# Patient Record
Sex: FEMALE
[De-identification: names, ages, dates, MRNs, and addresses within clinical notes are randomized per-mention and may not be internally consistent; named-entity substitution may affect disease eponyms.]

---

## 2018-01-08 ENCOUNTER — HOSPITAL ENCOUNTER (INPATIENT)
Dept: HOSPITAL 5 - ED | Age: 73
LOS: 3 days | Discharge: HOME | DRG: 175 | End: 2018-01-11
Attending: INTERNAL MEDICINE | Admitting: INTERNAL MEDICINE
Payer: MEDICARE

## 2018-01-08 DIAGNOSIS — J96.01: ICD-10-CM

## 2018-01-08 DIAGNOSIS — J84.9: ICD-10-CM

## 2018-01-08 DIAGNOSIS — Z79.52: ICD-10-CM

## 2018-01-08 DIAGNOSIS — G47.33: ICD-10-CM

## 2018-01-08 DIAGNOSIS — I27.20: ICD-10-CM

## 2018-01-08 DIAGNOSIS — I82.441: ICD-10-CM

## 2018-01-08 DIAGNOSIS — M06.9: ICD-10-CM

## 2018-01-08 DIAGNOSIS — H40.9: ICD-10-CM

## 2018-01-08 DIAGNOSIS — I82.492: ICD-10-CM

## 2018-01-08 DIAGNOSIS — I26.99: Primary | ICD-10-CM

## 2018-01-08 DIAGNOSIS — E78.00: ICD-10-CM

## 2018-01-08 DIAGNOSIS — I25.10: ICD-10-CM

## 2018-01-08 DIAGNOSIS — Z79.899: ICD-10-CM

## 2018-01-08 LAB
ALBUMIN SERPL-MCNC: 3.5 G/DL (ref 3.9–5)
ALT SERPL-CCNC: 28 UNITS/L (ref 7–56)
APTT BLD: 24.5 SEC. (ref 24.2–36.6)
BASOPHILS # (AUTO): 0.1 K/MM3 (ref 0–0.1)
BASOPHILS NFR BLD AUTO: 0.4 % (ref 0–1.8)
BUN SERPL-MCNC: 15 MG/DL (ref 7–17)
BUN/CREAT SERPL: 19 %
CALCIUM SERPL-MCNC: 9.2 MG/DL (ref 8.4–10.2)
EOSINOPHIL # BLD AUTO: 0.4 K/MM3 (ref 0–0.4)
EOSINOPHIL NFR BLD AUTO: 3 % (ref 0–4.3)
HCT VFR BLD CALC: 50.5 % (ref 30.3–42.9)
HEMOLYSIS INDEX: 14
HGB BLD-MCNC: 16.1 GM/DL (ref 10.1–14.3)
INR PPP: 1.01 (ref 0.87–1.13)
LYMPHOCYTES # BLD AUTO: 1.2 K/MM3 (ref 1.2–5.4)
LYMPHOCYTES NFR BLD AUTO: 9 % (ref 13.4–35)
MAGNESIUM SERPL-MCNC: 2 MG/DL (ref 1.7–2.3)
MCH RBC QN AUTO: 31 PG (ref 28–32)
MCHC RBC AUTO-ENTMCNC: 32 % (ref 30–34)
MCV RBC AUTO: 97 FL (ref 79–97)
MONOCYTES # (AUTO): 1 K/MM3 (ref 0–0.8)
MONOCYTES % (AUTO): 7.3 % (ref 0–7.3)
PLATELET # BLD: 260 K/MM3 (ref 140–440)
RBC # BLD AUTO: 5.22 M/MM3 (ref 3.65–5.03)

## 2018-01-08 PROCEDURE — 94640 AIRWAY INHALATION TREATMENT: CPT

## 2018-01-08 PROCEDURE — 85025 COMPLETE CBC W/AUTO DIFF WBC: CPT

## 2018-01-08 PROCEDURE — 85730 THROMBOPLASTIN TIME PARTIAL: CPT

## 2018-01-08 PROCEDURE — 85610 PROTHROMBIN TIME: CPT

## 2018-01-08 PROCEDURE — 85379 FIBRIN DEGRADATION QUANT: CPT

## 2018-01-08 PROCEDURE — 83880 ASSAY OF NATRIURETIC PEPTIDE: CPT

## 2018-01-08 PROCEDURE — 84443 ASSAY THYROID STIM HORMONE: CPT

## 2018-01-08 PROCEDURE — 93970 EXTREMITY STUDY: CPT

## 2018-01-08 PROCEDURE — 83735 ASSAY OF MAGNESIUM: CPT

## 2018-01-08 PROCEDURE — 80048 BASIC METABOLIC PNL TOTAL CA: CPT

## 2018-01-08 PROCEDURE — 94660 CPAP INITIATION&MGMT: CPT

## 2018-01-08 PROCEDURE — 93005 ELECTROCARDIOGRAM TRACING: CPT

## 2018-01-08 PROCEDURE — 36415 COLL VENOUS BLD VENIPUNCTURE: CPT

## 2018-01-08 PROCEDURE — 93306 TTE W/DOPPLER COMPLETE: CPT

## 2018-01-08 PROCEDURE — 96361 HYDRATE IV INFUSION ADD-ON: CPT

## 2018-01-08 PROCEDURE — 94760 N-INVAS EAR/PLS OXIMETRY 1: CPT

## 2018-01-08 PROCEDURE — 80053 COMPREHEN METABOLIC PANEL: CPT

## 2018-01-08 PROCEDURE — 93010 ELECTROCARDIOGRAM REPORT: CPT

## 2018-01-08 PROCEDURE — 84439 ASSAY OF FREE THYROXINE: CPT

## 2018-01-08 PROCEDURE — 96360 HYDRATION IV INFUSION INIT: CPT

## 2018-01-08 PROCEDURE — 71046 X-RAY EXAM CHEST 2 VIEWS: CPT

## 2018-01-08 PROCEDURE — 84436 ASSAY OF TOTAL THYROXINE: CPT

## 2018-01-08 PROCEDURE — 71275 CT ANGIOGRAPHY CHEST: CPT

## 2018-01-08 PROCEDURE — 84484 ASSAY OF TROPONIN QUANT: CPT

## 2018-01-08 NOTE — EMERGENCY DEPARTMENT REPORT
ED Shortness of Breath HPI





- General


Chief Complaint: Dyspnea/Respdistress


Stated Complaint: BREATHING DIFFICULTY, TACH


Time Seen by Provider: 01/08/18 20:12


Source: patient, RN notes reviewed, old records reviewed


Mode of arrival: Wheelchair


Limitations: No Limitations





- History of Present Illness


Initial Comments: 





This is a 72-year-old female, and the patient is previously unknown to me.





Primary care Dr.: Dr Maurer





Cardiology: Dr. Guy Duong





Pulmonology: Dr. ORLY Land; [phone]





Past medical history includes rheumatoid arthritis, chronic lung disease, 

suspected secondary to rheumatoid arthritis, high cholesterol, supposed to be 

home oxygen dependent, patient reports that she is poorly compliant with her 

home oxygen this is a 72-year-old female, the patient is previously unknown to 

this provider, she presents to the ER with a complaint of painless shortness of 

breath.  It has been present for 2-3 months.  It does not radiate anywhere, it 

worsens with physical exertion, and it decreases with rest.  There is no chest 

pain, there is no abdominal pain, there is no hematemesis or bright red blood 

per rectum, the patient denies irritative, obstructive urinary symptoms, she 

has chronic cough which is not a new, worsening or different, she is not 

wheezing.  Her primary care audiologist Center in for further evaluation.  She 

has no pulmonary embolus or DVT risk factors by history, denies leg pain, leg 

swelling, recent hospital admissions, recent hospitalizations, or surgeries.  

She reports chronic two-pillow orthopnea which is not a new, worsening or 

different.

















MD Complaint: shortness of breath


-: Gradual, month(s)


Severity: moderate


Consistency: intermittent


Improves With: oxygen, rest


Worsens With: lying flat, exertion


Known History Of: COPD, other


Treatments Prior to Arrival: oxygen





- Related Data


Home Oxygen Therapy: Yes


Home Oxygen Amount: 2 Liters


 Home Medications











 Medication  Instructions  Recorded  Confirmed  Last Taken


 


Amlodipine Besylate/Benazepril 42 mg PO QAM 09/28/15 01/08/18 01/08/18





[amLODIPine-Benazepril 10/40 mg]    


 


AtorvaSTATin [Lipitor] 40 mg PO QDAY 09/28/15 01/08/18 01/08/18


 


Levothyroxine [Synthroid] 112 mcg PO QAM 09/28/15 01/08/18 01/08/18


 


Methotrexate Sodium/Pf 25 mg PO 1XW 09/28/15 01/08/18 01/08/18





[Methotrexate 50 mg/2 ml Vial]    


 


Prednisone [predniSONE] 5 mg PO QDAY 09/28/15 01/08/18 01/08/18


 


ALBUTEROL Inhaler [Proair] 2 puff IH QID PRN 01/08/18 01/08/18 01/08/18


 


Benazepril HCl 40 mg PO DAILY 01/08/18 01/08/18 01/08/18


 


Betamethasone Dipropionate 1 applicatio TP BID 01/08/18 01/08/18 01/08/18





[Betamethasone Dipropionate 0.05%    





Cream]    


 


Calcium Carbonate/Vitamin D3 1 each PO BID 01/08/18 01/08/18 01/08/18





[Calcium 500-Vit D3 600 Tablet]    


 


Famotidine [Pepcid] 20 mg PO DAILY 01/08/18 01/08/18 01/08/18


 


Folic Acid 20 mg PO QDAY 01/08/18 01/08/18 01/08/18


 


Gabapentin [Neurontin] 300 mg PO Q8HR 01/08/18 01/08/18 01/08/18


 


Mv-Mn/Folic Acid/Calcium/Vit K 1 each PO DAILY 01/08/18 01/08/18 01/08/18





[Women's 50 Plus Daily Formula]    


 


Pramipexole [Mirapex] 0.125 mg PO QPM 01/08/18 01/08/18 01/08/18


 


Latanoprost 0.005% [Xalatan 0.005%] 1 drops HS 01/09/18 01/09/18 01/08/18


 


cycloSPORINE [Restasis] 1 drop 01/09/18 01/09/18 15:16








 Previous Rx's











 Medication  Instructions  Recorded  Last Taken  Type


 


ALBUTEROL Inhaler [ProAir HFA 2 puff IH QID PRN #1 inhalation 09/28/15 01/08/18 

Rx





Inhaler]    











 Allergies











Allergy/AdvReac Type Severity Reaction Status Date / Time


 


No Known Allergies Allergy   Verified 01/09/18 00:55














ED Review of Systems


ROS: 


Stated complaint: BREATHING DIFFICULTY, TACH


Other details as noted in HPI





Constitutional: malaise.  denies: fever


Eyes: denies: eye discharge


Respiratory: shortness of breath


Cardiovascular: dyspnea on exertion.  denies: chest pain


Gastrointestinal: denies: vomiting


Genitourinary: as per HPI


Musculoskeletal: as per HPI


Skin: as per HPI


Neurological: as per HPI, weakness


Psychiatric: as per HPI





ED Past Medical Hx





- Past Medical History


Previous Medical History?: Yes


Hx Hypertension: Yes


Hx Arthritis: Yes


Additional medical history: interstitial lung disease.  Thyroid disease.  

Lipidemia.  PAD.  sleep apnea





- Surgical History


Past Surgical History?: No





- Social History


Smoking Status: Former Smoker


Substance Use Type: None





- Medications


Home Medications: 


 Home Medications











 Medication  Instructions  Recorded  Confirmed  Last Taken  Type


 


ALBUTEROL Inhaler [ProAir HFA 2 puff IH QID PRN #1 inhalation 09/28/15 01/08/18 

01/08/18 Rx





Inhaler]     


 


Amlodipine Besylate/Benazepril 42 mg PO QAM 09/28/15 01/08/18 01/08/18 History





[amLODIPine-Benazepril 10/40 mg]     


 


AtorvaSTATin [Lipitor] 40 mg PO QDAY 09/28/15 01/08/18 01/08/18 History


 


Levothyroxine [Synthroid] 112 mcg PO QAM 09/28/15 01/08/18 01/08/18 History


 


Methotrexate Sodium/Pf 25 mg PO 1XW 09/28/15 01/08/18 01/08/18 History





[Methotrexate 50 mg/2 ml Vial]     


 


Prednisone [predniSONE] 5 mg PO QDAY 09/28/15 01/08/18 01/08/18 History


 


ALBUTEROL Inhaler [Proair] 2 puff IH QID PRN 01/08/18 01/08/18 01/08/18 History


 


Benazepril HCl 40 mg PO DAILY 01/08/18 01/08/18 01/08/18 History


 


Betamethasone Dipropionate 1 applicatio TP BID 01/08/18 01/08/18 01/08/18 

History





[Betamethasone Dipropionate 0.05%     





Cream]     


 


Calcium Carbonate/Vitamin D3 1 each PO BID 01/08/18 01/08/18 01/08/18 History





[Calcium 500-Vit D3 600 Tablet]     


 


Famotidine [Pepcid] 20 mg PO DAILY 01/08/18 01/08/18 01/08/18 History


 


Folic Acid 20 mg PO QDAY 01/08/18 01/08/18 01/08/18 History


 


Gabapentin [Neurontin] 300 mg PO Q8HR 01/08/18 01/08/18 01/08/18 History


 


Mv-Mn/Folic Acid/Calcium/Vit K 1 each PO DAILY 01/08/18 01/08/18 01/08/18 

History





[Women's 50 Plus Daily Formula]     


 


Pramipexole [Mirapex] 0.125 mg PO QPM 01/08/18 01/08/18 01/08/18 History


 


Latanoprost 0.005% [Xalatan 0.005%] 1 drops HS 01/09/18 01/09/18 01/08/18 

History


 


cycloSPORINE [Restasis] 1 drop 01/09/18 01/09/18 15:16 History














ED Physical Exam





- General


Limitations: No Limitations


General appearance: alert, in no apparent distress





- Head


Head exam: Present: atraumatic, normocephalic





- Eye


Eye exam: Present: normal appearance, EOMI.  Absent: nystagmus





- ENT


ENT exam: Present: normal exam, normal orophraynx, mucous membranes moist





- Neck


Neck exam: Present: normal inspection, full ROM





- Respiratory


Respiratory exam: Present: decreased breath sounds.  Absent: respiratory 

distress, wheezes, rales, rhonchi, stridor





- Cardiovascular


Cardiovascular Exam: Present: normal rhythm, tachycardia, normal heart sounds.  

Absent: systolic murmur, diastolic murmur, rubs, gallop





- GI/Abdominal


GI/Abdominal exam: Present: soft, normal bowel sounds.  Absent: distended, 

tenderness, guarding, rebound, rigid, pulsatile mass





- Extremities Exam


Extremities exam: Present: normal inspection, full ROM, normal capillary 

refill.  Absent: tenderness, pedal edema, joint swelling, calf tenderness





- Back Exam


Back exam: Present: normal inspection, full ROM.  Absent: tenderness, CVA 

tenderness (R), paraspinal tenderness, vertebral tenderness





- Neurological Exam


Neurological exam: Present: alert, oriented X3, CN II-XII intact, other (

Extraocular movements intact.  Tongue midline.  No facial droop.  Facial 

sensation intact to light touch in the V1, V2, V3 distribution bilaterally.  5 

and 5 strength in 4 extremities..  Sensation is intact to light touch in 4 

extremities.).  Absent: motor sensory deficit





- Psychiatric


Psychiatric exam: Present: normal affect, normal mood





- Skin


Skin exam: Present: warm, dry, intact, normal color.  Absent: rash





ED Course


 Vital Signs











  01/08/18 01/08/18





  18:33 22:00


 


Temperature 97.5 F L 98.3 F


 


Pulse Rate 118 H 98 H


 


Respiratory 22 20





Rate  


 


Blood Pressure 167/76 


 


Blood Pressure  139/71





[Left]  


 


O2 Sat by Pulse 81 L 95





Oximetry  














- Reevaluation(s)


Reevaluation #1: 





01/08/18 22:24


Differential diagnosis, including but not limited to: Chronic interstitial lung 

disease, rheumatoid lung disease, pulmonary hypertension, pulmonary embolus, 

pneumonia











Assessment and plan: 72-year-old female with a suspected history of 

interstitial lung disease, rheumatoid lung disease, COPD who is poorly 

compliant with home oxygen.  She has no pulmonary embolus or DVT risk factors 

and is low risk by well's criteria clinically, and she clinically does not 

appear to be acutely decompensated.  Initial vital signs are reviewed and 

appreciated, however currently the patient is not hypoxic on submental oxygen 

she is not tachycardic, she does not have wheezes, rales, rhonchi, she does not 

have significant lower extremity edema or JVD, and does not appear to be in 

acute distress.  Furthermore, she has no chest pain, and reports that her 

symptoms have been present for months, and by her own history reports that her 

symptoms are not dramatically worsened or different.  I highly suspect that the 

patient is experiencing a natural expected progression of her underlying 

chronic medical lung disease.











Her EKG is morphologically abnormal, troponin negative 1, repeat troponin is 

pending, d-dimer was markedly elevated, CT scan of the chest with IV contrast 

will be obtained.  I discussed her case with covering cardiology, Dr. Amaya, 

who indicated that his office would be happy to see the patient is a follow-up 

later on this week for further outpatient evaluation.  I have also contacted 

her covering pulmonologist, Dr. Basilio at [place], who indicated that his office 

would also be happy to have the patient follow-up with her on this week.  

Tachycardia resolved, patient having no pain at this time, CT scan of the chest 

is pending.


Reevaluation #2: 





01/09/18 00:48


Received call from radiology.  CT scan demonstrates multiple pulmonary emboli.  

No evidence of saddle embolus.  Lovenox is ordered.  Hospital physician, Dr. Clark accepts the patient to the medical service.  Given duration of symptoms, 

hemodynamic stability, I don't believe patient requires emergent vascular 

surgery consult at 1248 in the morning, and I will defer to the inpatient team 

for further management.





ED Medical Decision Making





- Lab Data


Result diagrams: 


 01/08/18 20:21





 01/08/18 20:21








 Vital Signs











  01/08/18 01/08/18





  18:33 22:00


 


Temperature 97.5 F L 98.3 F


 


Pulse Rate 118 H 98 H


 


Respiratory 22 20





Rate  


 


Blood Pressure 167/76 


 


Blood Pressure  139/71





[Left]  


 


O2 Sat by Pulse 81 L 95





Oximetry  














 Lab Results











  01/08/18 01/08/18 01/08/18 Range/Units





  20:21 20:21 20:21 


 


WBC  13.5 H    (4.5-11.0)  K/mm3


 


RBC  5.22 H    (3.65-5.03)  M/mm3


 


Hgb  16.1 H    (10.1-14.3)  gm/dl


 


Hct  50.5 H    (30.3-42.9)  %


 


MCV  97    (79-97)  fl


 


MCH  31    (28-32)  pg


 


MCHC  32    (30-34)  %


 


RDW  16.9 H    (13.2-15.2)  %


 


Plt Count  260    (140-440)  K/mm3


 


Lymph % (Auto)  9.0 L    (13.4-35.0)  %


 


Mono % (Auto)  7.3    (0.0-7.3)  %


 


Eos % (Auto)  3.0    (0.0-4.3)  %


 


Baso % (Auto)  0.4    (0.0-1.8)  %


 


Lymph #  1.2    (1.2-5.4)  K/mm3


 


Mono #  1.0 H    (0.0-0.8)  K/mm3


 


Eos #  0.4    (0.0-0.4)  K/mm3


 


Baso #  0.1    (0.0-0.1)  K/mm3


 


Seg Neutrophils %  80.3 H    (40.0-70.0)  %


 


Seg Neutrophils #  10.8 H    (1.8-7.7)  K/mm3


 


PT   13.8   (12.2-14.9)  Sec.


 


INR   1.01   (0.87-1.13)  


 


APTT   24.5   (24.2-36.6)  Sec.


 


D-Dimer   > 65110 H   (0-234)  ng/mlDDU


 


Sodium    141  (137-145)  mmol/L


 


Potassium    4.8  (3.6-5.0)  mmol/L


 


Chloride    103.0  ()  mmol/L


 


Carbon Dioxide    22  (22-30)  mmol/L


 


Anion Gap    21  mmol/L


 


BUN    15  (7-17)  mg/dL


 


Creatinine    0.8  (0.7-1.2)  mg/dL


 


Estimated GFR    > 60  ml/min


 


BUN/Creatinine Ratio    19  %


 


Glucose    103 H  ()  mg/dL


 


Calcium    9.2  (8.4-10.2)  mg/dL


 


Magnesium    2.00  (1.7-2.3)  mg/dL


 


Total Bilirubin    0.60  (0.1-1.2)  mg/dL


 


AST    30  (5-40)  units/L


 


ALT    28  (7-56)  units/L


 


Alkaline Phosphatase    89  ()  units/L


 


Troponin T    < 0.010  (0.00-0.029)  ng/mL


 


NT-Pro-B Natriuret Pep    347.7  (0-900)  pg/mL


 


Total Protein    7.4  (6.3-8.2)  g/dL


 


Albumin    3.5 L  (3.9-5)  g/dL


 


Albumin/Globulin Ratio    0.9  %


 


TSH     (0.270-4.200)  mlU/mL


 


Free T4     (0.76-1.46)  ng/dL














  01/08/18 01/08/18 Range/Units





  20:21 20:21 


 


WBC    (4.5-11.0)  K/mm3


 


RBC    (3.65-5.03)  M/mm3


 


Hgb    (10.1-14.3)  gm/dl


 


Hct    (30.3-42.9)  %


 


MCV    (79-97)  fl


 


MCH    (28-32)  pg


 


MCHC    (30-34)  %


 


RDW    (13.2-15.2)  %


 


Plt Count    (140-440)  K/mm3


 


Lymph % (Auto)    (13.4-35.0)  %


 


Mono % (Auto)    (0.0-7.3)  %


 


Eos % (Auto)    (0.0-4.3)  %


 


Baso % (Auto)    (0.0-1.8)  %


 


Lymph #    (1.2-5.4)  K/mm3


 


Mono #    (0.0-0.8)  K/mm3


 


Eos #    (0.0-0.4)  K/mm3


 


Baso #    (0.0-0.1)  K/mm3


 


Seg Neutrophils %    (40.0-70.0)  %


 


Seg Neutrophils #    (1.8-7.7)  K/mm3


 


PT    (12.2-14.9)  Sec.


 


INR    (0.87-1.13)  


 


APTT    (24.2-36.6)  Sec.


 


D-Dimer    (0-234)  ng/mlDDU


 


Sodium    (137-145)  mmol/L


 


Potassium    (3.6-5.0)  mmol/L


 


Chloride    ()  mmol/L


 


Carbon Dioxide    (22-30)  mmol/L


 


Anion Gap    mmol/L


 


BUN    (7-17)  mg/dL


 


Creatinine    (0.7-1.2)  mg/dL


 


Estimated GFR    ml/min


 


BUN/Creatinine Ratio    %


 


Glucose    ()  mg/dL


 


Calcium    (8.4-10.2)  mg/dL


 


Magnesium    (1.7-2.3)  mg/dL


 


Total Bilirubin    (0.1-1.2)  mg/dL


 


AST    (5-40)  units/L


 


ALT    (7-56)  units/L


 


Alkaline Phosphatase    ()  units/L


 


Troponin T    (0.00-0.029)  ng/mL


 


NT-Pro-B Natriuret Pep    (0-900)  pg/mL


 


Total Protein    (6.3-8.2)  g/dL


 


Albumin    (3.9-5)  g/dL


 


Albumin/Globulin Ratio    %


 


TSH   3.460  (0.270-4.200)  mlU/mL


 


Free T4  1.52 H   (0.76-1.46)  ng/dL














- EKG Data


-: EKG Interpreted by Me





- EKG Data





01/08/18 22:27


Sinus tachycardia, 108 bpm, left axis deviation, QTC prolonged, T-wave 

inversion in lead 3, abnormal EKG, not morphologically consistent with ST 

elevation myocardial infarction





EKG #2 is grossly unchanged there is motion artifact, nonspecific changes with 

appeared to prior EKG from 2015.





- Radiology Data


Radiology results: image reviewed


interpreted by me: 





X-ray of the chest, interpreted by me: Chronic pulmonary lung disease, chronic 

interstitial lung disease, chronic bibasilar atelectasis versus infiltrate; of 

note clinical history does not corroborate or suggest pneumonia


Critical care attestation.: 


If time is entered above; I have spent that time in minutes in the direct care 

of this critically ill patient, excluding procedure time.








ED Disposition


Clinical Impression: 


 Pulmonary emboli





Disposition: DC-09 OP ADMIT IP TO THIS HOSP


Is pt being admited?: Yes


Condition: Good

## 2018-01-09 PROCEDURE — 5A09357 ASSISTANCE WITH RESPIRATORY VENTILATION, LESS THAN 24 CONSECUTIVE HOURS, CONTINUOUS POSITIVE AIRWAY PRESSURE: ICD-10-PCS | Performed by: INTERNAL MEDICINE

## 2018-01-09 RX ADMIN — IPRATROPIUM BROMIDE AND ALBUTEROL SULFATE SCH: .5; 3 SOLUTION RESPIRATORY (INHALATION) at 10:01

## 2018-01-09 RX ADMIN — BETAMETHASONE DIPROPIONATE SCH APPLIC: 0.5 OINTMENT TOPICAL at 12:08

## 2018-01-09 RX ADMIN — Medication SCH EACH: at 12:08

## 2018-01-09 RX ADMIN — Medication SCH EACH: at 21:38

## 2018-01-09 RX ADMIN — FAMOTIDINE SCH MG: 20 TABLET ORAL at 12:08

## 2018-01-09 RX ADMIN — GABAPENTIN SCH MG: 300 CAPSULE ORAL at 15:20

## 2018-01-09 RX ADMIN — IPRATROPIUM BROMIDE AND ALBUTEROL SULFATE SCH: .5; 3 SOLUTION RESPIRATORY (INHALATION) at 16:35

## 2018-01-09 RX ADMIN — ENOXAPARIN SODIUM SCH MG: 150 INJECTION SUBCUTANEOUS at 15:19

## 2018-01-09 RX ADMIN — GABAPENTIN SCH MG: 300 CAPSULE ORAL at 21:38

## 2018-01-09 RX ADMIN — LEVOTHYROXINE SODIUM SCH MCG: 0.11 TABLET ORAL at 07:44

## 2018-01-09 RX ADMIN — GABAPENTIN SCH MG: 300 CAPSULE ORAL at 07:44

## 2018-01-09 RX ADMIN — IPRATROPIUM BROMIDE AND ALBUTEROL SULFATE SCH AMPUL: .5; 3 SOLUTION RESPIRATORY (INHALATION) at 20:18

## 2018-01-09 RX ADMIN — IPRATROPIUM BROMIDE AND ALBUTEROL SULFATE SCH: .5; 3 SOLUTION RESPIRATORY (INHALATION) at 14:16

## 2018-01-09 RX ADMIN — PRAMIPEXOLE DIHYDROCHLORIDE SCH MG: 0.12 TABLET ORAL at 17:50

## 2018-01-09 RX ADMIN — BETAMETHASONE DIPROPIONATE SCH APPLIC: 0.5 OINTMENT TOPICAL at 21:39

## 2018-01-09 RX ADMIN — AMLODIPINE BESYLATE SCH: 10 TABLET ORAL at 11:00

## 2018-01-09 RX ADMIN — PREDNISONE SCH MG: 5 TABLET ORAL at 12:08

## 2018-01-09 RX ADMIN — LISINOPRIL SCH MG: 40 TABLET ORAL at 15:20

## 2018-01-09 NOTE — CONSULTATION
History of Present Illness


Consult date: 01/09/18


Reason for consult: other (ILD)


History of present illness: 





Called to evaluate case of a 72-year-old female, with prior history of 

rheumatoid disease follow-up at [facility].  She presents with progressive shortness 

of breath, dyspnea on exertion of many weeks onset.  She denies fevers chills 

or hemoptysis.  She had been feeling progressively weak Sometimes with pressure 

on her chest.  The recent surgery or long distance travel.





The patient reports that she's been followed for ILD secondary to rheumatoid 

arthritis at [facility] with Dr. HOLLOWAY".  She tried to contact them during this period 

of time.  Was unable to do so.  She had also been told to use oxygen for the 

past year but she had been avoiding to do so.  Uses oxygen with CPAP for sleep 

apnea and nighttime.  Has been occurring methotrexate treatment for years.  

Denies any history of pulmonary hypertension or prior pulmonary embolism/DVT.  

No history of asian tea preparation, medications for weight loss treatment in 

the past.  No family history of pulmonary hypertension





Diagnostic evoluation performed on admission here showed evidence of DVT on 

bilateral lower extremity venous Doppler.  


CTA showing bilateral pulmonary embolism, reportedly extensive.  She can 

additional diagnostic findings with evidence of interstitial lung disease.My 

review of the scan shows significant septal thickening, subpleural fibrosis 

with honeycombing and traction bronchiectasis.  UIP-like pattern in the context 

of rheumatoid  disease.  


Echocardiogram showed evidence of left ventricle diastolic dysfunction and 

pulmonary hypertension with pressures at 72 mmHg.





Past History


Past Medical History: CAD, other (RA, fibromyalgia, glaucoma, sleep apnea)


Past Surgical History: No surgical history


Social history: full code.  denies: smoking, alcohol abuse, prescription drug 

abuse, IV drug use


Family history: no significant family history





Medications and Allergies


 Allergies











Allergy/AdvReac Type Severity Reaction Status Date / Time


 


No Known Allergies Allergy   Verified 01/09/18 00:55











 Home Medications











 Medication  Instructions  Recorded  Confirmed  Last Taken  Type


 


ALBUTEROL Inhaler [ProAir HFA 2 puff IH QID PRN #1 inhalation 09/28/15 01/08/18 

01/08/18 Rx





Inhaler]     


 


Amlodipine Besylate/Benazepril 42 mg PO QAM 09/28/15 01/08/18 01/08/18 History





[amLODIPine-Benazepril 10/40 mg]     


 


AtorvaSTATin [Lipitor] 40 mg PO QDAY 09/28/15 01/08/18 01/08/18 History


 


Levothyroxine [Synthroid] 112 mcg PO QAM 09/28/15 01/08/18 01/08/18 History


 


Methotrexate Sodium/Pf 25 mg PO 1XW 09/28/15 01/08/18 01/08/18 History





[Methotrexate 50 mg/2 ml Vial]     


 


Prednisone [predniSONE] 5 mg PO QDAY 09/28/15 01/08/18 01/08/18 History


 


ALBUTEROL Inhaler [Proair] 2 puff IH QID PRN 01/08/18 01/08/18 01/08/18 History


 


Benazepril HCl 40 mg PO DAILY 01/08/18 01/08/18 01/08/18 History


 


Betamethasone Dipropionate 1 applicatio TP BID 01/08/18 01/08/18 01/08/18 

History





[Betamethasone Dipropionate 0.05%     





Cream]     


 


Calcium Carbonate/Vitamin D3 1 each PO BID 01/08/18 01/08/18 01/08/18 History





[Calcium 500-Vit D3 600 Tablet]     


 


Famotidine [Pepcid] 20 mg PO DAILY 01/08/18 01/08/18 01/08/18 History


 


Folic Acid 20 mg PO QDAY 01/08/18 01/08/18 01/08/18 History


 


Gabapentin [Neurontin] 300 mg PO Q8HR 01/08/18 01/08/18 01/08/18 History


 


Mv-Mn/Folic Acid/Calcium/Vit K 1 each PO DAILY 01/08/18 01/08/18 01/08/18 

History





[Women's 50 Plus Daily Formula]     


 


Pramipexole [Mirapex] 0.125 mg PO QPM 01/08/18 01/08/18 01/08/18 History


 


Latanoprost 0.005% [Xalatan 0.005%] 1 drops HS 01/09/18 01/09/18 01/08/18 

History


 


cycloSPORINE [Restasis] 1 drop 01/09/18 01/09/18 15:16 History











Active Meds: 


Active Medications





Albuterol (Proventil)  2.5 mg IH Q4HRT PRN


   PRN Reason: Shortness Of Breath


   Last Admin: 01/09/18 09:57 Dose:  2.5 mg


Albuterol/Ipratropium (Duoneb *Not For Prn Use*)  1 ampul IH Q4HRT [facility]


   Last Admin: 01/09/18 16:35 Dose:  Not Given


Amlodipine Besylate (Norvasc)  10 mg PO DAILY [facility]


   Last Admin: 01/09/18 11:00 Dose:  Not Given


Atorvastatin Calcium (Lipitor)  40 mg PO QDAY [facility]


   Last Admin: 01/09/18 12:08 Dose:  40 mg


Betamethasone Dipropionate (Diprosone)  1 applic TP BID [facility]


   Last Admin: 01/09/18 12:08 Dose:  1 applic


Calcium/Vitamin D (Oysco D 500 Mg-200 Unit)  1 each PO BID [facility]


   Last Admin: 01/09/18 12:08 Dose:  1 each


Enoxaparin Sodium (Lovenox)  120 mg 1 mg/kg (120 mg) SUB-Q Q12H [facility]


   Last Admin: 01/09/18 15:19 Dose:  120 mg


Famotidine (Pepcid)  20 mg PO DAILY [facility]


   Last Admin: 01/09/18 12:08 Dose:  20 mg


Gabapentin (Neurontin)  300 mg PO Q8HR [facility]


   Last Admin: 01/09/18 15:20 Dose:  300 mg


Levothyroxine Sodium (Synthroid)  112 mcg PO QAM@0600 [facility]


   Last Admin: 01/09/18 07:44 Dose:  112 mcg


Lisinopril (Zestril)  40 mg PO QDAY [facility]


   Last Admin: 01/09/18 15:20 Dose:  40 mg


Miscellaneous Medication (Methotrexate Sodium/Pf [Methotrexate 50 Mg/2 Ml Vial]

)  25 mg PO 1XW [facility]


Pramipexole Dihydrochloride (Mirapex)  0.125 mg PO QPM [facility]


Prednisone (Deltasone)  5 mg PO QDAY [facility]


   Last Admin: 01/09/18 12:08 Dose:  5 mg











Review of Systems


Constitutional: fatigue, weakness, no fever, no chills, no sweats, no night 

sweats


Cardiovascular: palpitations, edema, lightheadedness, shortness of breath, 

dyspnea on exertion, no chest pain, no orthopnea, no syncope


Respiratory: no cough, no cough with sputum, no hemoptysis, no wheezing


Gastrointestinal: no abdominal pain, no nausea, no vomiting


Integumentary: no rash


Neurological: no head injury, no transient paralysis, no paralysis, no weakness

, no parathesias, no numbness, no vertigo, no headaches


Hematologic/Lymphatic: no easy bruising, no easy bleeding, no lymphadenopathy, 

no lymphedema, no thrombophilia





Physical Examination


Vital signs: 


 Vital Signs











Temp Pulse Resp BP Pulse Ox


 


 97.5 F L  118 H  22   167/76   81 L


 


 01/08/18 18:33  01/08/18 18:33  01/08/18 18:33  01/08/18 18:33  01/08/18 18:33











General appearance: no acute distress, alert, other (morbidly obese)


Eyes: non-icteric


ENT: oropharynx moist, other (Mallampati 3.  Perioral cyanosis with patient OFF 

oxygen)


Effort: normal


Ascultation: Bilateral: rales (bilateral inspiratory crackles lower half of 

both pulmonary fields.  No wheezing)


Percussion: Bilateral: not dull


Cardiovascular: regular rate and rhythm, other (no murmur)


Gastrointestinal: normoactive bowel sounds, non-tender, non-distended


Integumentary: normal


Extremities: cyanosis, other (trace pretibial edema.  Clubbing present)


normal mental status, non-focal exam, CN II-XII normal


mood appropriate, affect normal





Results





- Laboratory Findings


CBC and BMP: 


 01/08/18 20:21





 01/08/18 20:21


PT/INR, D-dimer











PT  13.8 Sec. (12.2-14.9)   01/08/18  20:21    


 


INR  1.01  (0.87-1.13)   01/08/18  20:21    


 


D-Dimer  > 22568 ng/mlDDU (0-234)  H  01/08/18  20:21    








Abnormal lab findings: 


 Abnormal Labs











  01/08/18 01/08/18 01/08/18





  20:21 20:21 20:21


 


WBC  13.5 H  


 


RBC  5.22 H  


 


Hgb  16.1 H  


 


Hct  50.5 H  


 


RDW  16.9 H  


 


Lymph % (Auto)  9.0 L  


 


Mono #  1.0 H  


 


Seg Neutrophils %  80.3 H  


 


Seg Neutrophils #  10.8 H  


 


D-Dimer   > 17629 H 


 


Glucose    103 H


 


Albumin    3.5 L


 


Free T4   














  01/08/18





  20:21


 


WBC 


 


RBC 


 


Hgb 


 


Hct 


 


RDW 


 


Lymph % (Auto) 


 


Mono # 


 


Seg Neutrophils % 


 


Seg Neutrophils # 


 


D-Dimer 


 


Glucose 


 


Albumin 


 


Free T4  1.52 H














- Diagnostic Findings


Chest x-ray: report reviewed, image reviewed


CT scan - chest: report reviewed, image reviewed





Assessment and Plan





Bilateral pulmonary embolism


DVT


Interstitial lung disease.  Suggestive of rheumatoid lung in the context of 

prior RA.  Also prior history of methotrexate treatment


Pulmonary hypertension.  Most likely chronic since his unlikely patient will 

survive acute cor pulmonale secondary to PE, with pressures > 40 mmHg.  Also no 

large right ventricular strain on echocardiogram.  Most likely either PAH or 

CTEPH /chronic Thrombo-embolic pulmonary hypertension.  Also chronically 

hypoxemic so it could be also reactive to this.








Recommendations





Albuterol 2.5 milligram nebulizations every 4-6 hours with or without 

ipratropium if chest congestion noted


Discussed with patient oxygen use in detail.  Oxygen support via nasal cannula 

or mask to maintain oximetry over 92%


Continue anticoagulation for pulmonary embolism


CPAP with oxygen nighttime


Regarding her ILD, the patient will need further reevaluation.  Specifically, 

whether to continue with methotrexate (which can cause ILD) versus biological 

agent and performing additional biopsy evaluation is necessary for her ILD.  

Being realistic, bronchoscopy is not good for this ( drug induce vs CTD ILD ) 

and the patient might be at the point where open lung biopsy may be difficult 

to perform, because of medical risk


She will also need evaluation for pulmonary hypertension/PAH.  I will recommend 

to continue with aggressive 24/7 oxygen support and consider at some point R/L 

cardiac catheterization.  It may be difficult to separate chronic PAH from CTPEH

, but if right heart catheterization is supportive of PAH Dx, the patient might 

be a candidate for Riociguat therapy


HIV testing  for PHT


VQ scan for CTEPH





Findings were discussed with the patient and her  in detail.  All 

questions answered.











Thanks

## 2018-01-09 NOTE — PROGRESS NOTE
<GUNNER ARCE - Last Filed: 01/09/18 15:57>





Assessment and Plan


Assessment and plan: 





72-year-old female with medical history significant for interstitial lung 

disease, rheumatoid arthritis, fibromyalgia, glaucoma, sleep apnea presented to 

the ED complaining of shortness of breath, hyperventilating for the last 2-3 

months








Large bilateral PE


- She is on therapeutic Lovenox


- Vascular Surgery following, doesn't recommend catheter directed thrombolysis


-Echo ordered





Interstitial lung disease


Chronic Pulmonary consulted





Rheumatoid arthritis


Chronic, continue home medications





Sleep apnea


Uses CPAP at home, pulmonary consulted





CAD


- continue home meds





DVT


Venous US showed ACUTE DVT NOTED IN RT. PTV AND LT. SOLEAL VEIN CLOT NOTED TO 

BE EXTENDING INTO LT PERONEAL VEINS


Pt on Lovenox





History


Interval history: 





Pt was seen and examined. Complains only of SOB with exertion. Denies CH, NV.





Hospitalist Physical





- Constitutional


Vitals: 


 











Temp Pulse Resp BP Pulse Ox


 


 98.2 F   84   20   136/60   87 


 


 01/09/18 13:48  01/09/18 13:48  01/09/18 13:48  01/09/18 13:48  01/09/18 13:48











General appearance: Present: no acute distress, well-nourished





- EENT


Eyes: Present: PERRL, EOM intact


ENT: hearing intact, clear oral mucosa, dentition normal





- Neck


Neck: Present: supple, normal ROM





- Respiratory


Respiratory effort: normal


Respiratory: bilateral: CTA





- Cardiovascular


Rhythm: regular


Heart Sounds: Present: S1 & S2





- Extremities


Extremities: no ischemia, No edema


Peripheral Pulses: within normal limits





- Abdominal


General gastrointestinal: soft, non-tender





- Integumentary


Integumentary: Present: clear, warm, dry





- Psychiatric


Psychiatric: appropriate mood/affect, cooperative





- Neurologic


Neurologic: CNII-XII intact, moves all extremities





- Allied Health


Allied health notes reviewed: nursing





Results





- Labs


CBC & Chem 7: 


 01/08/18 20:21





 01/08/18 20:21


Labs: 


 Laboratory Last Values











WBC  13.5 K/mm3 (4.5-11.0)  H  01/08/18  20:21    


 


RBC  5.22 M/mm3 (3.65-5.03)  H  01/08/18  20:21    


 


Hgb  16.1 gm/dl (10.1-14.3)  H  01/08/18  20:21    


 


Hct  50.5 % (30.3-42.9)  H  01/08/18  20:21    


 


MCV  97 fl (79-97)   01/08/18  20:21    


 


MCH  31 pg (28-32)   01/08/18  20:21    


 


MCHC  32 % (30-34)   01/08/18  20:21    


 


RDW  16.9 % (13.2-15.2)  H  01/08/18  20:21    


 


Plt Count  260 K/mm3 (140-440)   01/08/18  20:21    


 


Lymph % (Auto)  9.0 % (13.4-35.0)  L  01/08/18  20:21    


 


Mono % (Auto)  7.3 % (0.0-7.3)   01/08/18  20:21    


 


Eos % (Auto)  3.0 % (0.0-4.3)   01/08/18  20:21    


 


Baso % (Auto)  0.4 % (0.0-1.8)   01/08/18  20:21    


 


Lymph #  1.2 K/mm3 (1.2-5.4)   01/08/18  20:21    


 


Mono #  1.0 K/mm3 (0.0-0.8)  H  01/08/18  20:21    


 


Eos #  0.4 K/mm3 (0.0-0.4)   01/08/18  20:21    


 


Baso #  0.1 K/mm3 (0.0-0.1)   01/08/18  20:21    


 


Seg Neutrophils %  80.3 % (40.0-70.0)  H  01/08/18  20:21    


 


Seg Neutrophils #  10.8 K/mm3 (1.8-7.7)  H  01/08/18  20:21    


 


PT  13.8 Sec. (12.2-14.9)   01/08/18  20:21    


 


INR  1.01  (0.87-1.13)   01/08/18  20:21    


 


APTT  24.5 Sec. (24.2-36.6)   01/08/18  20:21    


 


D-Dimer  > 78318 ng/mlDDU (0-234)  H  01/08/18  20:21    


 


Sodium  141 mmol/L (137-145)   01/08/18  20:21    


 


Potassium  4.8 mmol/L (3.6-5.0)   01/08/18  20:21    


 


Chloride  103.0 mmol/L ()   01/08/18  20:21    


 


Carbon Dioxide  22 mmol/L (22-30)   01/08/18  20:21    


 


Anion Gap  21 mmol/L  01/08/18  20:21    


 


BUN  15 mg/dL (7-17)   01/08/18  20:21    


 


Creatinine  0.8 mg/dL (0.7-1.2)   01/08/18  20:21    


 


Estimated GFR  > 60 ml/min  01/08/18  20:21    


 


BUN/Creatinine Ratio  19 %  01/08/18  20:21    


 


Glucose  103 mg/dL ()  H  01/08/18  20:21    


 


Calcium  9.2 mg/dL (8.4-10.2)   01/08/18  20:21    


 


Magnesium  2.00 mg/dL (1.7-2.3)   01/08/18  20:21    


 


Total Bilirubin  0.60 mg/dL (0.1-1.2)   01/08/18  20:21    


 


AST  30 units/L (5-40)   01/08/18  20:21    


 


ALT  28 units/L (7-56)   01/08/18  20:21    


 


Alkaline Phosphatase  89 units/L ()   01/08/18  20:21    


 


Troponin T  < 0.010 ng/mL (0.00-0.029)   01/08/18  22:55    


 


NT-Pro-B Natriuret Pep  347.7 pg/mL (0-900)   01/08/18  20:21    


 


Total Protein  7.4 g/dL (6.3-8.2)   01/08/18  20:21    


 


Albumin  3.5 g/dL (3.9-5)  L  01/08/18  20:21    


 


Albumin/Globulin Ratio  0.9 %  01/08/18  20:21    


 


TSH  3.460 mlU/mL (0.270-4.200)   01/08/18  20:21    


 


Free T4  1.52 ng/dL (0.76-1.46)  H  01/08/18  20:21    














<KAY CABALLERO M - Last Filed: 01/10/18 17:21>





Hospitalist Physical





- Constitutional


Vitals: 


 











Temp Pulse Resp BP Pulse Ox


 


 97.3 F L  80   20   134/58   93 


 


 01/10/18 13:44  01/10/18 16:22  01/10/18 16:22  01/10/18 13:44  01/10/18 14:30














Results





- Labs


CBC & Chem 7: 


 01/08/18 20:21





 01/10/18 11:00


Labs: 


 Laboratory Last Values











WBC  13.5 K/mm3 (4.5-11.0)  H  01/08/18  20:21    


 


RBC  5.22 M/mm3 (3.65-5.03)  H  01/08/18  20:21    


 


Hgb  16.1 gm/dl (10.1-14.3)  H  01/08/18  20:21    


 


Hct  50.5 % (30.3-42.9)  H  01/08/18  20:21    


 


MCV  97 fl (79-97)   01/08/18  20:21    


 


MCH  31 pg (28-32)   01/08/18  20:21    


 


MCHC  32 % (30-34)   01/08/18  20:21    


 


RDW  16.9 % (13.2-15.2)  H  01/08/18  20:21    


 


Plt Count  260 K/mm3 (140-440)   01/08/18  20:21    


 


Lymph % (Auto)  9.0 % (13.4-35.0)  L  01/08/18  20:21    


 


Mono % (Auto)  7.3 % (0.0-7.3)   01/08/18  20:21    


 


Eos % (Auto)  3.0 % (0.0-4.3)   01/08/18  20:21    


 


Baso % (Auto)  0.4 % (0.0-1.8)   01/08/18  20:21    


 


Lymph #  1.2 K/mm3 (1.2-5.4)   01/08/18  20:21    


 


Mono #  1.0 K/mm3 (0.0-0.8)  H  01/08/18  20:21    


 


Eos #  0.4 K/mm3 (0.0-0.4)   01/08/18  20:21    


 


Baso #  0.1 K/mm3 (0.0-0.1)   01/08/18  20:21    


 


Seg Neutrophils %  80.3 % (40.0-70.0)  H  01/08/18  20:21    


 


Seg Neutrophils #  10.8 K/mm3 (1.8-7.7)  H  01/08/18  20:21    


 


PT  13.8 Sec. (12.2-14.9)   01/08/18  20:21    


 


INR  1.01  (0.87-1.13)   01/08/18  20:21    


 


APTT  24.5 Sec. (24.2-36.6)   01/08/18  20:21    


 


D-Dimer  > 11483 ng/mlDDU (0-234)  H  01/08/18  20:21    


 


Sodium  138 mmol/L (137-145)   01/10/18  11:00    


 


Potassium  4.4 mmol/L (3.6-5.0)   01/10/18  11:00    


 


Chloride  102.0 mmol/L ()   01/10/18  11:00    


 


Carbon Dioxide  19 mmol/L (22-30)  L  01/10/18  11:00    


 


Anion Gap  21 mmol/L  01/10/18  11:00    


 


BUN  8 mg/dL (7-17)   01/10/18  11:00    


 


Creatinine  0.8 mg/dL (0.7-1.2)   01/10/18  11:00    


 


Estimated GFR  > 60 ml/min  01/10/18  11:00    


 


BUN/Creatinine Ratio  10 %  01/10/18  11:00    


 


Glucose  193 mg/dL ()  H  01/10/18  11:00    


 


Calcium  8.7 mg/dL (8.4-10.2)   01/10/18  11:00    


 


Magnesium  2.00 mg/dL (1.7-2.3)   01/08/18  20:21    


 


Total Bilirubin  0.60 mg/dL (0.1-1.2)   01/08/18  20:21    


 


AST  30 units/L (5-40)   01/08/18  20:21    


 


ALT  28 units/L (7-56)   01/08/18  20:21    


 


Alkaline Phosphatase  89 units/L ()   01/08/18  20:21    


 


Troponin T  < 0.010 ng/mL (0.00-0.029)   01/08/18  22:55    


 


NT-Pro-B Natriuret Pep  347.7 pg/mL (0-900)   01/08/18  20:21    


 


Total Protein  7.4 g/dL (6.3-8.2)   01/08/18  20:21    


 


Albumin  3.5 g/dL (3.9-5)  L  01/08/18  20:21    


 


Albumin/Globulin Ratio  0.9 %  01/08/18  20:21    


 


TSH  3.460 mlU/mL (0.270-4.200)   01/08/18  20:21    


 


Free T4  1.52 ng/dL (0.76-1.46)  H  01/08/18  20:21    


 


Thyroxine (T4)  10.0 ug/dL (4.0-12.0)   01/10/18  11:00

## 2018-01-09 NOTE — HISTORY AND PHYSICAL REPORT
History of Present Illness


Date of examination: 01/09/18


Date of admission: 


01/09/18 00:54





Chief complaint: 





Shortness of breath


History of present illness: 


72-year-old  female with past medical history significant for 

interstitial lung disease, rheumatoid arthritis, fibromyalgia, glaucoma, sleep 

apnean presented to the emergency department complaining of shortness of breath

, hyperventilating, and dry mouth for the last 2-3 months.  Patient has been 

using oxygen and non compliant.  Patient denies chest pain, fever, chills, 

palpitation, dizziness at current presentation.  Patient said she has history 

of  CAD and has been followed by Dr John Dave in the office. Has been followed 

by pulmonary and rheumatologist at [place].  In the ED and CTA was done shows 

large bilateral PE.  











Past History


Past Medical History: CAD, other (RA, fibromyalgia, glaucoma, sleep apnea)


Past Surgical History: No surgical history


Social history: full code.  denies: smoking, alcohol abuse, prescription drug 

abuse, IV drug use


Family history: no significant family history





Medications and Allergies


 Allergies











Allergy/AdvReac Type Severity Reaction Status Date / Time


 


No Known Allergies Allergy   Verified 01/09/18 00:55











 Home Medications











 Medication  Instructions  Recorded  Confirmed  Last Taken  Type


 


ALBUTEROL Inhaler [ProAir HFA 2 puff IH QID PRN #1 inhalation 09/28/15 01/08/18 

01/08/18 Rx





Inhaler]     


 


Amlodipine Besylate/Benazepril 42 mg PO QAM 09/28/15 01/08/18 01/08/18 History





[amLODIPine-Benazepril 10/40 mg]     


 


AtorvaSTATin [Lipitor] 40 mg PO QDAY 09/28/15 01/08/18 01/08/18 History


 


Levothyroxine [Synthroid] 112 mcg PO QAM 09/28/15 01/08/18 01/08/18 History


 


Methotrexate Sodium/Pf 25 mg PO 1XW 09/28/15 01/08/18 01/08/18 History





[Methotrexate 50 mg/2 ml Vial]     


 


Prednisone [predniSONE] 5 mg PO QDAY 09/28/15 01/08/18 01/08/18 History


 


ALBUTEROL Inhaler [Proair] 2 puff IH QID PRN 01/08/18 01/08/18 01/08/18 History


 


Benazepril HCl 40 mg PO DAILY 01/08/18 01/08/18 01/08/18 History


 


Betamethasone Dipropionate 1 applicatio TP BID 01/08/18 01/08/18 01/08/18 

History





[Betamethasone Dipropionate 0.05%     





Cream]     


 


Calcium Carbonate/Vitamin D3 1 each PO BID 01/08/18 01/08/18 01/08/18 History





[Calcium 500-Vit D3 600 Tablet]     


 


Famotidine [Pepcid] 20 mg PO DAILY 01/08/18 01/08/18 01/08/18 History


 


Folic Acid 20 mg PO QDAY 01/08/18 01/08/18 01/08/18 History


 


Gabapentin [Neurontin] 300 mg PO Q8HR 01/08/18 01/08/18 01/08/18 History


 


Mv-Mn/Folic Acid/Calcium/Vit K 1 each PO DAILY 01/08/18 01/08/18 01/08/18 

History





[Women's 50 Plus Daily Formula]     


 


Pramipexole [Mirapex] 0.125 mg PO QPM 01/08/18 01/08/18 01/08/18 History











Active Meds: 


Active Medications





Albuterol (Proair)  2 puff IH QID PRN


   PRN Reason: Shortness Of Breath


Albuterol/Ipratropium (Duoneb *Not For Prn Use*)  1 ampul IH Q4HRT [facility]


Atorvastatin Calcium (Lipitor)  40 mg PO QDAY [facility]


Enoxaparin Sodium (Lovenox)  120 mg 1 mg/kg (120 mg) SUB-Q Q12H [facility]


Famotidine (Pepcid)  20 mg PO DAILY [facility]


Gabapentin (Neurontin)  300 mg PO Q8HR [facility]


Levothyroxine Sodium (Synthroid)  112 mcg PO QAM [facility]


Miscellaneous Medication (Amlodipine Besylate/Benazepril [Amlodipine-Benazepril 

10/40 Mg])  42 mg PO QAM [facility]


Miscellaneous Medication (Benazepril Hcl [Benazepril Hcl])  40 mg PO DAILY [facility]


Miscellaneous Medication (Betamethasone Dipropionate [Betamethasone 

Dipropionate 0.05% Cream])  1 applicatio TP BID [facility]


Miscellaneous Medication (Calcium Carbonate/Vitamin D3 [Calcium 500-Vit D3 600 

Tablet])  1 each PO BID [facility]


Miscellaneous Medication (Methotrexate Sodium/Pf [Methotrexate 50 Mg/2 Ml Vial]

)  25 mg PO 1XW [facility]


Miscellaneous Medication (Prednisone [Prednisone])  5 mg PO QDAY [facility]


Pramipexole Dihydrochloride (Mirapex)  0.125 mg PO QPM [facility]











Exam





- Physical Exam


Narrative exam: 





 Not in cardiopulmonary distress. 


 The patient is morbidly obese.


 Vital signs as documented.


 Head exam is unremarkable.


 No scleral icterus .


 Neck is without jugular venous distension, thyromegaly, or carotid bruits. 


 Lungs are clear to auscultation.


Cardiac exam reveals regular rate and  Rhythm. First and second heart sounds 

normal. No murmurs, rubs or gallops. 


Abdominal exam reveals normal bowel sounds, no masses, no organomegaly and no 

aortic enlargement. 


Extremities are nonedematous and both femoral and pedal pulses are normal.


CNS: Alert and oriented 3.  No focal weakness.





- Constitutional


Vitals: 


 











Temp Pulse Resp BP Pulse Ox


 


 97.4 F L  96 H  20   128/58   95 


 


 01/09/18 03:49  01/09/18 03:49  01/09/18 03:49  01/09/18 03:49  01/08/18 22:00














Results





- Labs


CBC & Chem 7: 


 01/08/18 20:21





 01/08/18 20:21


Labs: 


 Laboratory Last Values











WBC  13.5 K/mm3 (4.5-11.0)  H  01/08/18  20:21    


 


RBC  5.22 M/mm3 (3.65-5.03)  H  01/08/18  20:21    


 


Hgb  16.1 gm/dl (10.1-14.3)  H  01/08/18  20:21    


 


Hct  50.5 % (30.3-42.9)  H  01/08/18  20:21    


 


MCV  97 fl (79-97)   01/08/18  20:21    


 


MCH  31 pg (28-32)   01/08/18  20:21    


 


MCHC  32 % (30-34)   01/08/18  20:21    


 


RDW  16.9 % (13.2-15.2)  H  01/08/18  20:21    


 


Plt Count  260 K/mm3 (140-440)   01/08/18  20:21    


 


Lymph % (Auto)  9.0 % (13.4-35.0)  L  01/08/18  20:21    


 


Mono % (Auto)  7.3 % (0.0-7.3)   01/08/18  20:21    


 


Eos % (Auto)  3.0 % (0.0-4.3)   01/08/18  20:21    


 


Baso % (Auto)  0.4 % (0.0-1.8)   01/08/18  20:21    


 


Lymph #  1.2 K/mm3 (1.2-5.4)   01/08/18  20:21    


 


Mono #  1.0 K/mm3 (0.0-0.8)  H  01/08/18  20:21    


 


Eos #  0.4 K/mm3 (0.0-0.4)   01/08/18  20:21    


 


Baso #  0.1 K/mm3 (0.0-0.1)   01/08/18  20:21    


 


Seg Neutrophils %  80.3 % (40.0-70.0)  H  01/08/18  20:21    


 


Seg Neutrophils #  10.8 K/mm3 (1.8-7.7)  H  01/08/18  20:21    


 


PT  13.8 Sec. (12.2-14.9)   01/08/18  20:21    


 


INR  1.01  (0.87-1.13)   01/08/18  20:21    


 


APTT  24.5 Sec. (24.2-36.6)   01/08/18  20:21    


 


D-Dimer  > 93262 ng/mlDDU (0-234)  H  01/08/18  20:21    


 


Sodium  141 mmol/L (137-145)   01/08/18  20:21    


 


Potassium  4.8 mmol/L (3.6-5.0)   01/08/18  20:21    


 


Chloride  103.0 mmol/L ()   01/08/18  20:21    


 


Carbon Dioxide  22 mmol/L (22-30)   01/08/18  20:21    


 


Anion Gap  21 mmol/L  01/08/18  20:21    


 


BUN  15 mg/dL (7-17)   01/08/18  20:21    


 


Creatinine  0.8 mg/dL (0.7-1.2)   01/08/18  20:21    


 


Estimated GFR  > 60 ml/min  01/08/18  20:21    


 


BUN/Creatinine Ratio  19 %  01/08/18  20:21    


 


Glucose  103 mg/dL ()  H  01/08/18  20:21    


 


Calcium  9.2 mg/dL (8.4-10.2)   01/08/18  20:21    


 


Magnesium  2.00 mg/dL (1.7-2.3)   01/08/18  20:21    


 


Total Bilirubin  0.60 mg/dL (0.1-1.2)   01/08/18  20:21    


 


AST  30 units/L (5-40)   01/08/18  20:21    


 


ALT  28 units/L (7-56)   01/08/18  20:21    


 


Alkaline Phosphatase  89 units/L ()   01/08/18  20:21    


 


Troponin T  < 0.010 ng/mL (0.00-0.029)   01/08/18  22:55    


 


NT-Pro-B Natriuret Pep  347.7 pg/mL (0-900)   01/08/18  20:21    


 


Total Protein  7.4 g/dL (6.3-8.2)   01/08/18  20:21    


 


Albumin  3.5 g/dL (3.9-5)  L  01/08/18  20:21    


 


Albumin/Globulin Ratio  0.9 %  01/08/18  20:21    


 


TSH  3.460 mlU/mL (0.270-4.200)   01/08/18  20:21    


 


Free T4  1.52 ng/dL (0.76-1.46)  H  01/08/18  20:21    














- Imaging and Cardiology


CT scan - chest: report reviewed (large PE)





Assessment and Plan


Assessment and plan: 


72-year-old female with medical history significant for interstitial lung 

disease, rheumatoid arthritis, fibromyalgia, glaucoma, sleep apnea presented to 

the ED complaining of shortness of breath, hyperventilating for the last 2-3 

months


Large bilateral PE


- She is on therapeutic Lovenox


- Vascular Surgery consulted


Interstitial lung disease


Rheumatoid arthritis


Sleep apnea


CAD


- continue home meds


- Supportive care


- Pulmonary consult


Disposition


- Admit to medical floor





Advance Directives: Yes


VTE prophylaxis?: Chemical


Plan of care discussed with patient/family: Yes

## 2018-01-09 NOTE — CONSULTATION
History of Present Illness





- Reason for Consult


Consult date: 01/09/18





- History of Present Illness





This is a 72 year old female seen in consult for recently discovered PE.  She 

came to the ER yesterday, as advised by a nurse from her cardiologist's office.

  She had been feeling progressively short of breath, and it was beginning to 

interfere with her daily activities.





According to the patient, this shortness of breath and began 2-3 months ago, 

and has slowly worsened since then.  There has not been an acute change in 

symptoms within the last few days.  She sees a cardiologist for coronary artery 

disease and a pulmonologist for interstitial lung disease.  She is on home 

oxygen, 3 L nasal cannula, currently.





A CT was performed in the ER which demonstrated the majority of the clot burden 

to be in the right pulmonary arterial tree, mostly in segmental and 

subsegmental branches.  There is a small amount on the left.  There is no 

central or main pulmonary embolus.





Past History


Past Medical History: CAD, other (RA, fibromyalgia, glaucoma, sleep apnea)


Past Surgical History: No surgical history


Social history: full code.  denies: smoking, alcohol abuse, prescription drug 

abuse, IV drug use


Family history: no significant family history





Medications and Allergies


 Allergies











Allergy/AdvReac Type Severity Reaction Status Date / Time


 


No Known Allergies Allergy   Verified 01/09/18 00:55











 Home Medications











 Medication  Instructions  Recorded  Confirmed  Last Taken  Type


 


ALBUTEROL Inhaler [ProAir HFA 2 puff IH QID PRN #1 inhalation 09/28/15 01/08/18 

01/08/18 Rx





Inhaler]     


 


Amlodipine Besylate/Benazepril 42 mg PO QAM 09/28/15 01/08/18 01/08/18 History





[amLODIPine-Benazepril 10/40 mg]     


 


AtorvaSTATin [Lipitor] 40 mg PO QDAY 09/28/15 01/08/18 01/08/18 History


 


Levothyroxine [Synthroid] 112 mcg PO QAM 09/28/15 01/08/18 01/08/18 History


 


Methotrexate Sodium/Pf 25 mg PO 1XW 09/28/15 01/08/18 01/08/18 History





[Methotrexate 50 mg/2 ml Vial]     


 


Prednisone [predniSONE] 5 mg PO QDAY 09/28/15 01/08/18 01/08/18 History


 


ALBUTEROL Inhaler [Proair] 2 puff IH QID PRN 01/08/18 01/08/18 01/08/18 History


 


Benazepril HCl 40 mg PO DAILY 01/08/18 01/08/18 01/08/18 History


 


Betamethasone Dipropionate 1 applicatio TP BID 01/08/18 01/08/18 01/08/18 

History





[Betamethasone Dipropionate 0.05%     





Cream]     


 


Calcium Carbonate/Vitamin D3 1 each PO BID 01/08/18 01/08/18 01/08/18 History





[Calcium 500-Vit D3 600 Tablet]     


 


Famotidine [Pepcid] 20 mg PO DAILY 01/08/18 01/08/18 01/08/18 History


 


Folic Acid 20 mg PO QDAY 01/08/18 01/08/18 01/08/18 History


 


Gabapentin [Neurontin] 300 mg PO Q8HR 01/08/18 01/08/18 01/08/18 History


 


Mv-Mn/Folic Acid/Calcium/Vit K 1 each PO DAILY 01/08/18 01/08/18 01/08/18 

History





[Women's 50 Plus Daily Formula]     


 


Pramipexole [Mirapex] 0.125 mg PO QPM 01/08/18 01/08/18 01/08/18 History











Active Meds: 


Active Medications





Albuterol (Proventil)  2.5 mg IH Q4HRT PRN


   PRN Reason: Shortness Of Breath


Albuterol/Ipratropium (Duoneb *Not For Prn Use*)  1 ampul IH Q4HRT [facility]


Amlodipine Besylate (Norvasc)  10 mg PO DAILY [facility]


Atorvastatin Calcium (Lipitor)  40 mg PO QDAY [facility]


Betamethasone Dipropionate (Diprosone)  1 applic TP BID [facility]


Calcium/Vitamin D ([place] D 500 Mg-200 Unit)  1 each PO BID [facility]


Enoxaparin Sodium (Lovenox)  120 mg 1 mg/kg (120 mg) SUB-Q Q12H [facility]


Famotidine (Pepcid)  20 mg PO DAILY [facility]


Gabapentin (Neurontin)  300 mg PO Q8HR [facility]


   Last Admin: 01/09/18 07:44 Dose:  300 mg


Levothyroxine Sodium (Synthroid)  112 mcg PO QAM@0600 [facility]


   Last Admin: 01/09/18 07:44 Dose:  112 mcg


Lisinopril (Zestril)  40 mg PO QDAY [facility]


Miscellaneous Medication (Methotrexate Sodium/Pf [Methotrexate 50 Mg/2 Ml Vial]

)  25 mg PO 1XW [facility]


Pramipexole Dihydrochloride (Mirapex)  0.125 mg PO QPM [facility]


Prednisone (Deltasone)  5 mg PO QDAY [facility]











Exam





- Constitutional


Vitals: 


 











Temp Pulse Resp BP Pulse Ox


 


 98.1 F   100 H  20   117/57   90 


 


 01/09/18 07:46  01/09/18 07:46  01/09/18 07:46  01/09/18 07:46  01/09/18 07:46











General appearance: Present: no acute distress, well-nourished





- EENT


Eyes: Present: PERRL


ENT: hearing intact, clear oral mucosa





- Neck


Neck: Present: supple, normal ROM





- Respiratory


Respiratory effort: other (she is taking somewhat shallow breaths, but they do 

not appear to be particularly labored.)





Results





- Labs


CBC & Chem 7: 


 01/08/18 20:21





 01/08/18 20:21


Labs: 


 Abnormal lab results











  01/08/18 01/08/18 01/08/18 Range/Units





  20:21 20:21 20:21 


 


WBC  13.5 H    (4.5-11.0)  K/mm3


 


RBC  5.22 H    (3.65-5.03)  M/mm3


 


Hgb  16.1 H    (10.1-14.3)  gm/dl


 


Hct  50.5 H    (30.3-42.9)  %


 


RDW  16.9 H    (13.2-15.2)  %


 


Lymph % (Auto)  9.0 L    (13.4-35.0)  %


 


Mono #  1.0 H    (0.0-0.8)  K/mm3


 


Seg Neutrophils %  80.3 H    (40.0-70.0)  %


 


Seg Neutrophils #  10.8 H    (1.8-7.7)  K/mm3


 


D-Dimer   > 91735 H   (0-234)  ng/mlDDU


 


Glucose    103 H  ()  mg/dL


 


Albumin    3.5 L  (3.9-5)  g/dL


 


Free T4     (0.76-1.46)  ng/dL














  01/08/18 Range/Units





  20:21 


 


WBC   (4.5-11.0)  K/mm3


 


RBC   (3.65-5.03)  M/mm3


 


Hgb   (10.1-14.3)  gm/dl


 


Hct   (30.3-42.9)  %


 


RDW   (13.2-15.2)  %


 


Lymph % (Auto)   (13.4-35.0)  %


 


Mono #   (0.0-0.8)  K/mm3


 


Seg Neutrophils %   (40.0-70.0)  %


 


Seg Neutrophils #   (1.8-7.7)  K/mm3


 


D-Dimer   (0-234)  ng/mlDDU


 


Glucose   ()  mg/dL


 


Albumin   (3.9-5)  g/dL


 


Free T4  1.52 H  (0.76-1.46)  ng/dL














- Imaging and Cardiology


CT scan - chest: report reviewed, image reviewed





Assessment and Plan





At this point, I do not believe she would benefit from catheter directed 

thrombolysis.  Her clot burden is mostly in the segmental and subsegmental 

branches, and she is hemodynamically stable overall.  She appears comfortable 

in general and is able to talk in full sentences without labored breathing.  

Her BNP is 347.  It is possible that these clots are chronic, as she said her 

symptoms began several months ago.  However, this would be difficult to 

distinguish given the superimposed interstitial lung disease.





If there is concern that this is an acute clot, anticoagulation can be 

initiated.  A lower extremity venous duplex to evaluate for DVT is also 

recommended.

## 2018-01-09 NOTE — CAT SCAN REPORT
FINAL REPORT



EXAM:  CT ANGIO CHEST



HISTORY:  sob 



TECHNIQUE:  High-resolution helical axial images were obtained of

the chest during intravenous administration of iodinated

contrast.  Images are reconstructed in the sagittal and coronal

planes.



PRIORS:  None.



FINDINGS:  

There pulmonary arterial filling defects in the distal main right

pulmonary artery and in arterial branch to the right upper lobe

and in several segmental and subsegmental branches. There is a

large amount of thrombus in the main artery to the right middle

lobe. There are multiple filling defects in right lower lobe

segmental and subsegmental arteries. On the left there is PE

present in a left upper lobe segmental and associated

subsegmental branches, in the lingular branch and in several left

lower lobe segmental and subsegmental branches. 



There is coronary artery atherosclerotic calcification. Heart is

mildly enlarged. 



There is diffuse bilateral subpleural fibrosis and honeycombing

consistent with chronic interstitial lung disease and pulmonary

fibrosis. There is pleural effusion. 



There is a small hiatal hernia. Otherwise, images through the

upper abdomen are unremarkable.



The bones are unremarkable. 



IMPRESSION:  

1. There is a large amount of bilateral PE 



2. Chronic interstitial lung disease and pulmonary fibrosis 



3. Mild cardiomegaly 



4. Coronary artery disease 



I gave a verbal report by phone to Dr. Drake at 12:42 a.m.

eastern standard time.

## 2018-01-10 LAB
BUN SERPL-MCNC: 8 MG/DL (ref 7–17)
BUN/CREAT SERPL: 10 %
CALCIUM SERPL-MCNC: 8.7 MG/DL (ref 8.4–10.2)
HEMOLYSIS INDEX: 40

## 2018-01-10 PROCEDURE — 5A09357 ASSISTANCE WITH RESPIRATORY VENTILATION, LESS THAN 24 CONSECUTIVE HOURS, CONTINUOUS POSITIVE AIRWAY PRESSURE: ICD-10-PCS | Performed by: INTERNAL MEDICINE

## 2018-01-10 RX ADMIN — IPRATROPIUM BROMIDE AND ALBUTEROL SULFATE SCH: .5; 3 SOLUTION RESPIRATORY (INHALATION) at 01:21

## 2018-01-10 RX ADMIN — IPRATROPIUM BROMIDE AND ALBUTEROL SULFATE SCH: .5; 3 SOLUTION RESPIRATORY (INHALATION) at 09:30

## 2018-01-10 RX ADMIN — AMLODIPINE BESYLATE SCH MG: 10 TABLET ORAL at 10:16

## 2018-01-10 RX ADMIN — IPRATROPIUM BROMIDE AND ALBUTEROL SULFATE SCH AMPUL: .5; 3 SOLUTION RESPIRATORY (INHALATION) at 20:05

## 2018-01-10 RX ADMIN — LISINOPRIL SCH: 40 TABLET ORAL at 10:17

## 2018-01-10 RX ADMIN — ENOXAPARIN SODIUM SCH MG: 150 INJECTION SUBCUTANEOUS at 13:45

## 2018-01-10 RX ADMIN — PREDNISONE SCH MG: 5 TABLET ORAL at 10:08

## 2018-01-10 RX ADMIN — IPRATROPIUM BROMIDE AND ALBUTEROL SULFATE SCH AMPUL: .5; 3 SOLUTION RESPIRATORY (INHALATION) at 16:20

## 2018-01-10 RX ADMIN — LEVOTHYROXINE SODIUM SCH MCG: 0.11 TABLET ORAL at 05:20

## 2018-01-10 RX ADMIN — IPRATROPIUM BROMIDE AND ALBUTEROL SULFATE SCH: .5; 3 SOLUTION RESPIRATORY (INHALATION) at 13:05

## 2018-01-10 RX ADMIN — ENOXAPARIN SODIUM SCH MG: 150 INJECTION SUBCUTANEOUS at 02:00

## 2018-01-10 RX ADMIN — FAMOTIDINE SCH MG: 20 TABLET ORAL at 10:08

## 2018-01-10 RX ADMIN — IPRATROPIUM BROMIDE AND ALBUTEROL SULFATE SCH: .5; 3 SOLUTION RESPIRATORY (INHALATION) at 06:24

## 2018-01-10 RX ADMIN — Medication SCH EACH: at 10:08

## 2018-01-10 RX ADMIN — IPRATROPIUM BROMIDE AND ALBUTEROL SULFATE SCH AMPUL: .5; 3 SOLUTION RESPIRATORY (INHALATION) at 08:16

## 2018-01-10 RX ADMIN — BETAMETHASONE DIPROPIONATE SCH APPLIC: 0.5 OINTMENT TOPICAL at 10:11

## 2018-01-10 RX ADMIN — GABAPENTIN SCH MG: 300 CAPSULE ORAL at 13:42

## 2018-01-10 RX ADMIN — GABAPENTIN SCH MG: 300 CAPSULE ORAL at 22:43

## 2018-01-10 RX ADMIN — Medication SCH EACH: at 22:35

## 2018-01-10 RX ADMIN — GABAPENTIN SCH MG: 300 CAPSULE ORAL at 05:20

## 2018-01-10 RX ADMIN — PRAMIPEXOLE DIHYDROCHLORIDE SCH MG: 0.12 TABLET ORAL at 17:52

## 2018-01-10 NOTE — PROGRESS NOTE
Assessment and Plan





Bilateral pulmonary embolism. Clinically better, o bleeding


DVT


Hypoxemic respiratory failure. Better on oxygen


Interstitial lung disease.  Suggestive of rheumatoid lung in the context of 

prior RA.  Also prior history of methotrexate treatment


Pulmonary hypertension.  Most likely chronic since his unlikely patient will 

survive acute cor pulmonale secondary to PE, with pressures > 40 mmHg.  Also no 

large right ventricular strain on echocardiogram.  Most likely either PAH or 

CTEPH /chronic Thrombo-embolic pulmonary hypertension.  Also chronically 

hypoxemic so it could be also reactive to this.








Recommendations





Continue nebs


Continue current oxygen support


 Agree no clear advantage to use thrombolytics


Continue anticoagulation for pulmonary embolism. Likely lifelong TX


CPAP with oxygen nighttime


Regarding her ILD, the patient will need further reevaluation.  Specifically, 

whether to continue with methotrexate (which can cause ILD) versus biological 

agent and performing additional biopsy evaluation is necessary for her ILD.  

Being realistic, bronchoscopy is not good for this ( drug induce vs CTD ILD ) 

and the patient might be at the point where open lung biopsy may be difficult 

to perform, because of medical risk


Needs further evaluation, f/u  for pulmonary hypertension/PAH: 


 -  recommend  24/7 oxygen support 


 - at some point R/L cardiac catheterization.  It may be difficult to separate 

chronic PAH from CTPEH, but if right heart catheterization is supportive of PAH 

Dx, the patient might be a candidate for Riociguat therapy


 - HIV testing  for PHT


 - baseline VQ scan for CTEPH.This can be done OPD also





Findings were discussed with the patient and her  in detail.  All 

questions answered.











 





Subjective


Date of service: 01/10/18


Interval history: 





Feeles better today.No chest pain or hemoptysis





Objective


 Vital Signs - 12hr











  01/10/18 01/10/18 01/10/18





  04:42 08:34 09:41


 


Temperature 97.5 F L  97.7 F


 


Pulse Rate 92 H  81


 


Respiratory 20 24 20





Rate   


 


Blood Pressure 125/67  122/64


 


O2 Sat by Pulse 89 94 92





Oximetry   














  01/10/18 01/10/18 01/10/18





  10:16 10:17 13:44


 


Temperature   97.3 F L


 


Pulse Rate 81 81 88


 


Respiratory   20





Rate   


 


Blood Pressure 122/64 122/64 134/58


 


O2 Sat by Pulse   92





Oximetry   











Constitutional: no acute distress, alert, other (morbidly obese)


Eyes: non-icteric


ENT: oropharynx moist, other (Mallampati 3.   )


Effort: normal


Ascultation: Bilateral: rales (bilateral inspiratory crackles lower half of 

both pulmonary fields.  No wheezing)


Percussion: Bilateral: not dull


Cardiovascular: regular rate and rhythm, other (no murmur)


Gastrointestinal: normoactive bowel sounds, non-tender, non-distended


Integumentary: normal


Extremities: cyanosis, other (trace pretibial edema.  Clubbing present)


Neurologic: normal mental status, non-focal exam, CN II-XII normal


Psychiatric: mood appropriate, affect normal


CBC and BMP: 


 01/08/18 20:21





 01/10/18 11:00


ABG, PT/INR, D-dimer: 


PT/INR, D-dimer











PT  13.8 Sec. (12.2-14.9)   01/08/18  20:21    


 


INR  1.01  (0.87-1.13)   01/08/18  20:21    


 


D-Dimer  > 29828 ng/mlDDU (0-234)  H  01/08/18  20:21    








Abnormal lab findings: 


 Abnormal Labs











  01/08/18 01/08/18 01/08/18





  20:21 20:21 20:21


 


WBC  13.5 H  


 


RBC  5.22 H  


 


Hgb  16.1 H  


 


Hct  50.5 H  


 


RDW  16.9 H  


 


Lymph % (Auto)  9.0 L  


 


Mono #  1.0 H  


 


Seg Neutrophils %  80.3 H  


 


Seg Neutrophils #  10.8 H  


 


D-Dimer   > 24572 H 


 


Carbon Dioxide   


 


Glucose    103 H


 


Albumin    3.5 L


 


Free T4   














  01/08/18 01/10/18





  20:21 11:00


 


WBC  


 


RBC  


 


Hgb  


 


Hct  


 


RDW  


 


Lymph % (Auto)  


 


Mono #  


 


Seg Neutrophils %  


 


Seg Neutrophils #  


 


D-Dimer  


 


Carbon Dioxide   19 L


 


Glucose   193 H


 


Albumin  


 


Free T4  1.52 H

## 2018-01-10 NOTE — PROGRESS NOTE
<GUNNER ARCE - Last Filed: 01/10/18 13:18>





Assessment and Plan


Assessment and plan: 





72-year-old female with medical history significant for interstitial lung 

disease, rheumatoid arthritis, fibromyalgia, glaucoma, sleep apnea presented to 

the ED complaining of shortness of breath, hyperventilating for the last 2-3 

months





Large bilateral PE


- She is on therapeutic Lovenox


- Vascular Surgery following, doesn't recommend catheter directed thrombolysis


- Echo showed evidence of left ventricle diastolic dysfunction and pulmonary 

hypertension with pressures at 72 mmHg, EV 50-55%


-Pulm following recommend heart cath to further evaluate Pulmonary HTN- 

Cardiology consulted





Interstitial lung disease


Chronic Pulmonary following





Rheumatoid arthritis


Chronic, continue home medications





Sleep apnea


Continue CPAP 





CAD


- continue home meds





DVT


Venous US showed ACUTE DVT NOTED IN RT. PTV AND LT. SOLEAL VEIN CLOT NOTED TO 

BE EXTENDING INTO LT PERONEAL VEINS


Pt on Lovenox











History


Interval history: 





Pt was seen and examined. Complains only of SOB with exertion. Denies CH, NV.





Hospitalist Physical





- Constitutional


Vitals: 


 











Temp Pulse Resp BP Pulse Ox


 


 97.7 F   81   20   122/64   92 


 


 01/10/18 09:41  01/10/18 10:17  01/10/18 09:41  01/10/18 10:17  01/10/18 09:41











General appearance: Present: no acute distress, well-nourished





- EENT


Eyes: Present: PERRL, EOM intact


ENT: hearing intact, clear oral mucosa





- Neck


Neck: Present: supple, normal ROM





- Respiratory


Respiratory effort: normal


Respiratory: bilateral: diminished





- Cardiovascular


Rhythm: regular


Heart Sounds: Present: S1 & S2





- Extremities


Extremities: no ischemia, No edema





- Abdominal


General gastrointestinal: soft, non-tender





- Integumentary


Integumentary: Present: clear, warm, dry





- Psychiatric


Psychiatric: appropriate mood/affect, cooperative





- Neurologic


Neurologic: CNII-XII intact, moves all extremities





- Allied Health


Allied health notes reviewed: nursing





Results





- Labs


CBC & Chem 7: 


 01/08/18 20:21





 01/08/18 20:21


Labs: 


 Laboratory Last Values











WBC  13.5 K/mm3 (4.5-11.0)  H  01/08/18  20:21    


 


RBC  5.22 M/mm3 (3.65-5.03)  H  01/08/18  20:21    


 


Hgb  16.1 gm/dl (10.1-14.3)  H  01/08/18  20:21    


 


Hct  50.5 % (30.3-42.9)  H  01/08/18  20:21    


 


MCV  97 fl (79-97)   01/08/18  20:21    


 


MCH  31 pg (28-32)   01/08/18  20:21    


 


MCHC  32 % (30-34)   01/08/18  20:21    


 


RDW  16.9 % (13.2-15.2)  H  01/08/18  20:21    


 


Plt Count  260 K/mm3 (140-440)   01/08/18  20:21    


 


Lymph % (Auto)  9.0 % (13.4-35.0)  L  01/08/18  20:21    


 


Mono % (Auto)  7.3 % (0.0-7.3)   01/08/18  20:21    


 


Eos % (Auto)  3.0 % (0.0-4.3)   01/08/18  20:21    


 


Baso % (Auto)  0.4 % (0.0-1.8)   01/08/18  20:21    


 


Lymph #  1.2 K/mm3 (1.2-5.4)   01/08/18  20:21    


 


Mono #  1.0 K/mm3 (0.0-0.8)  H  01/08/18  20:21    


 


Eos #  0.4 K/mm3 (0.0-0.4)   01/08/18  20:21    


 


Baso #  0.1 K/mm3 (0.0-0.1)   01/08/18  20:21    


 


Seg Neutrophils %  80.3 % (40.0-70.0)  H  01/08/18  20:21    


 


Seg Neutrophils #  10.8 K/mm3 (1.8-7.7)  H  01/08/18  20:21    


 


PT  13.8 Sec. (12.2-14.9)   01/08/18  20:21    


 


INR  1.01  (0.87-1.13)   01/08/18  20:21    


 


APTT  24.5 Sec. (24.2-36.6)   01/08/18  20:21    


 


D-Dimer  > 06535 ng/mlDDU (0-234)  H  01/08/18  20:21    


 


Sodium  141 mmol/L (137-145)   01/08/18  20:21    


 


Potassium  4.8 mmol/L (3.6-5.0)   01/08/18  20:21    


 


Chloride  103.0 mmol/L ()   01/08/18  20:21    


 


Carbon Dioxide  22 mmol/L (22-30)   01/08/18  20:21    


 


Anion Gap  21 mmol/L  01/08/18  20:21    


 


BUN  15 mg/dL (7-17)   01/08/18  20:21    


 


Creatinine  0.8 mg/dL (0.7-1.2)   01/08/18  20:21    


 


Estimated GFR  > 60 ml/min  01/08/18  20:21    


 


BUN/Creatinine Ratio  19 %  01/08/18  20:21    


 


Glucose  103 mg/dL ()  H  01/08/18  20:21    


 


Calcium  9.2 mg/dL (8.4-10.2)   01/08/18  20:21    


 


Magnesium  2.00 mg/dL (1.7-2.3)   01/08/18  20:21    


 


Total Bilirubin  0.60 mg/dL (0.1-1.2)   01/08/18  20:21    


 


AST  30 units/L (5-40)   01/08/18  20:21    


 


ALT  28 units/L (7-56)   01/08/18  20:21    


 


Alkaline Phosphatase  89 units/L ()   01/08/18  20:21    


 


Troponin T  < 0.010 ng/mL (0.00-0.029)   01/08/18  22:55    


 


NT-Pro-B Natriuret Pep  347.7 pg/mL (0-900)   01/08/18  20:21    


 


Total Protein  7.4 g/dL (6.3-8.2)   01/08/18  20:21    


 


Albumin  3.5 g/dL (3.9-5)  L  01/08/18  20:21    


 


Albumin/Globulin Ratio  0.9 %  01/08/18  20:21    


 


TSH  3.460 mlU/mL (0.270-4.200)   01/08/18  20:21    


 


Free T4  1.52 ng/dL (0.76-1.46)  H  01/08/18  20:21    














<KAY CABALLERO M - Last Filed: 01/10/18 17:27>





Hospitalist Physical





- Constitutional


Vitals: 


 











Temp Pulse Resp BP Pulse Ox


 


 97.3 F L  80   20   134/58   93 


 


 01/10/18 13:44  01/10/18 16:22  01/10/18 16:22  01/10/18 13:44  01/10/18 14:30














Results





- Labs


CBC & Chem 7: 


 01/08/18 20:21





 01/10/18 11:00


Labs: 


 Laboratory Last Values











WBC  13.5 K/mm3 (4.5-11.0)  H  01/08/18  20:21    


 


RBC  5.22 M/mm3 (3.65-5.03)  H  01/08/18  20:21    


 


Hgb  16.1 gm/dl (10.1-14.3)  H  01/08/18  20:21    


 


Hct  50.5 % (30.3-42.9)  H  01/08/18  20:21    


 


MCV  97 fl (79-97)   01/08/18  20:21    


 


MCH  31 pg (28-32)   01/08/18  20:21    


 


MCHC  32 % (30-34)   01/08/18  20:21    


 


RDW  16.9 % (13.2-15.2)  H  01/08/18  20:21    


 


Plt Count  260 K/mm3 (140-440)   01/08/18  20:21    


 


Lymph % (Auto)  9.0 % (13.4-35.0)  L  01/08/18  20:21    


 


Mono % (Auto)  7.3 % (0.0-7.3)   01/08/18  20:21    


 


Eos % (Auto)  3.0 % (0.0-4.3)   01/08/18  20:21    


 


Baso % (Auto)  0.4 % (0.0-1.8)   01/08/18  20:21    


 


Lymph #  1.2 K/mm3 (1.2-5.4)   01/08/18  20:21    


 


Mono #  1.0 K/mm3 (0.0-0.8)  H  01/08/18  20:21    


 


Eos #  0.4 K/mm3 (0.0-0.4)   01/08/18  20:21    


 


Baso #  0.1 K/mm3 (0.0-0.1)   01/08/18  20:21    


 


Seg Neutrophils %  80.3 % (40.0-70.0)  H  01/08/18  20:21    


 


Seg Neutrophils #  10.8 K/mm3 (1.8-7.7)  H  01/08/18  20:21    


 


PT  13.8 Sec. (12.2-14.9)   01/08/18  20:21    


 


INR  1.01  (0.87-1.13)   01/08/18  20:21    


 


APTT  24.5 Sec. (24.2-36.6)   01/08/18  20:21    


 


D-Dimer  > 72160 ng/mlDDU (0-234)  H  01/08/18  20:21    


 


Sodium  138 mmol/L (137-145)   01/10/18  11:00    


 


Potassium  4.4 mmol/L (3.6-5.0)   01/10/18  11:00    


 


Chloride  102.0 mmol/L ()   01/10/18  11:00    


 


Carbon Dioxide  19 mmol/L (22-30)  L  01/10/18  11:00    


 


Anion Gap  21 mmol/L  01/10/18  11:00    


 


BUN  8 mg/dL (7-17)   01/10/18  11:00    


 


Creatinine  0.8 mg/dL (0.7-1.2)   01/10/18  11:00    


 


Estimated GFR  > 60 ml/min  01/10/18  11:00    


 


BUN/Creatinine Ratio  10 %  01/10/18  11:00    


 


Glucose  193 mg/dL ()  H  01/10/18  11:00    


 


Calcium  8.7 mg/dL (8.4-10.2)   01/10/18  11:00    


 


Magnesium  2.00 mg/dL (1.7-2.3)   01/08/18  20:21    


 


Total Bilirubin  0.60 mg/dL (0.1-1.2)   01/08/18  20:21    


 


AST  30 units/L (5-40)   01/08/18  20:21    


 


ALT  28 units/L (7-56)   01/08/18  20:21    


 


Alkaline Phosphatase  89 units/L ()   01/08/18  20:21    


 


Troponin T  < 0.010 ng/mL (0.00-0.029)   01/08/18  22:55    


 


NT-Pro-B Natriuret Pep  347.7 pg/mL (0-900)   01/08/18  20:21    


 


Total Protein  7.4 g/dL (6.3-8.2)   01/08/18  20:21    


 


Albumin  3.5 g/dL (3.9-5)  L  01/08/18  20:21    


 


Albumin/Globulin Ratio  0.9 %  01/08/18  20:21    


 


TSH  3.460 mlU/mL (0.270-4.200)   01/08/18  20:21    


 


Free T4  1.52 ng/dL (0.76-1.46)  H  01/08/18  20:21    


 


Thyroxine (T4)  10.0 ug/dL (4.0-12.0)   01/10/18  11:00

## 2018-01-10 NOTE — EVENT NOTE
Date: 01/10/18





Pt awake and alert.





Denies new complaint at present.





Prelimb venous report:


BLE VENOUS DUPLEX COMPLETED. VAS LAB PRELIMINARY REPORT; ACUTE DVT NOTED IN RT. 

PTV AND LT. SOLEAL VEIN CLOT NOTED TO BE EXTENDING INTO LT PERONEAL VEINS. 

PHYSICIANS REPORT TO FOLLOW...(BALJINDER)


Initialized on 01/09/18 11:34 





Pt has pre-existing interstitial lung dz, now with acute dvt and PE. Her 

symptoms have been progressively worse over the last 2-3 months. This resulted 

in decreased activity which may have contributed to the development of the dvt/

pe. She has had an approximate 18lb wt loss over the last month (278 to 260lbs)

, though she states she has been trying to lose wt. She denies bleeding with 

urination or BM. She had an isolated episode of coughing up blood several 

months ago, and nothing since.





Pt presently on Lovenox. Do not recommend thrombolytic therapy as it likely 

offers little benefit (with significant risk of bleeding). Pt will need to be 

converted to an oral anticoagulant (would consider Eliquis). Recommend d/c 

planning for Home Eliquis. Will see again as needed.

## 2018-01-11 VITALS — SYSTOLIC BLOOD PRESSURE: 121 MMHG | DIASTOLIC BLOOD PRESSURE: 60 MMHG

## 2018-01-11 RX ADMIN — IPRATROPIUM BROMIDE AND ALBUTEROL SULFATE SCH AMPUL: .5; 3 SOLUTION RESPIRATORY (INHALATION) at 07:48

## 2018-01-11 RX ADMIN — ENOXAPARIN SODIUM SCH MG: 150 INJECTION SUBCUTANEOUS at 12:31

## 2018-01-11 RX ADMIN — ENOXAPARIN SODIUM SCH MG: 150 INJECTION SUBCUTANEOUS at 00:45

## 2018-01-11 RX ADMIN — AMLODIPINE BESYLATE SCH MG: 10 TABLET ORAL at 10:36

## 2018-01-11 RX ADMIN — PRAMIPEXOLE DIHYDROCHLORIDE SCH MG: 0.12 TABLET ORAL at 17:47

## 2018-01-11 RX ADMIN — IPRATROPIUM BROMIDE AND ALBUTEROL SULFATE SCH AMPUL: .5; 3 SOLUTION RESPIRATORY (INHALATION) at 14:07

## 2018-01-11 RX ADMIN — GABAPENTIN SCH MG: 300 CAPSULE ORAL at 10:36

## 2018-01-11 RX ADMIN — GABAPENTIN SCH MG: 300 CAPSULE ORAL at 13:12

## 2018-01-11 RX ADMIN — Medication SCH EACH: at 10:37

## 2018-01-11 RX ADMIN — LISINOPRIL SCH MG: 40 TABLET ORAL at 10:36

## 2018-01-11 RX ADMIN — PREDNISONE SCH MG: 5 TABLET ORAL at 10:36

## 2018-01-11 RX ADMIN — FAMOTIDINE SCH MG: 20 TABLET ORAL at 10:36

## 2018-01-11 NOTE — PROGRESS NOTE
Assessment and Plan


Assessment and plan: 








72-year-old female with medical history significant for interstitial lung 

disease, rheumatoid arthritis, fibromyalgia, glaucoma, sleep apnea presented to 

the ED complaining of shortness of breath, hyperventilating for the last 2-3 

months





Large bilateral PE


- She is on therapeutic Lovenox


- Vascular Surgery following, doesn't recommend catheter directed thrombolysis


- Echo showed evidence of left ventricle diastolic dysfunction and pulmonary 

hypertension with pressures at 72 mmHg, EV 50-55%


-Pulm following recommend heart cath to further evaluate Pulmonary HTN- 

Cardiology consulted





Interstitial lung disease


Chronic Pulmonary following





Rheumatoid arthritis


Chronic, continue home medications





Sleep apnea


Continue CPAP 





CAD


- continue home meds





DVT


Venous US showed ACUTE DVT NOTED IN RT. PTV AND LT. SOLEAL VEIN CLOT NOTED TO 

BE EXTENDING INTO LT PERONEAL VEINS


Pt on Lovenox





To be DC on [facility]





Hospitalist Physical





- Constitutional


Vitals: 


 











Temp Pulse Resp BP Pulse Ox


 


 98.2 F   106 H  20   130/82   91 


 


 01/11/18 07:58  01/11/18 07:58  01/11/18 07:58  01/11/18 07:58  01/11/18 07:58











General appearance: Present: no acute distress, well-nourished





Results





- Labs


CBC & Chem 7: 


 01/08/18 20:21





 01/10/18 11:00


Labs: 


 Laboratory Last Values











WBC  13.5 K/mm3 (4.5-11.0)  H  01/08/18  20:21    


 


RBC  5.22 M/mm3 (3.65-5.03)  H  01/08/18  20:21    


 


Hgb  16.1 gm/dl (10.1-14.3)  H  01/08/18  20:21    


 


Hct  50.5 % (30.3-42.9)  H  01/08/18  20:21    


 


MCV  97 fl (79-97)   01/08/18  20:21    


 


MCH  31 pg (28-32)   01/08/18  20:21    


 


MCHC  32 % (30-34)   01/08/18  20:21    


 


RDW  16.9 % (13.2-15.2)  H  01/08/18  20:21    


 


Plt Count  260 K/mm3 (140-440)   01/08/18  20:21    


 


Lymph % (Auto)  9.0 % (13.4-35.0)  L  01/08/18  20:21    


 


Mono % (Auto)  7.3 % (0.0-7.3)   01/08/18  20:21    


 


Eos % (Auto)  3.0 % (0.0-4.3)   01/08/18  20:21    


 


Baso % (Auto)  0.4 % (0.0-1.8)   01/08/18  20:21    


 


Lymph #  1.2 K/mm3 (1.2-5.4)   01/08/18  20:21    


 


Mono #  1.0 K/mm3 (0.0-0.8)  H  01/08/18  20:21    


 


Eos #  0.4 K/mm3 (0.0-0.4)   01/08/18  20:21    


 


Baso #  0.1 K/mm3 (0.0-0.1)   01/08/18  20:21    


 


Seg Neutrophils %  80.3 % (40.0-70.0)  H  01/08/18  20:21    


 


Seg Neutrophils #  10.8 K/mm3 (1.8-7.7)  H  01/08/18  20:21    


 


PT  13.8 Sec. (12.2-14.9)   01/08/18  20:21    


 


INR  1.01  (0.87-1.13)   01/08/18  20:21    


 


APTT  24.5 Sec. (24.2-36.6)   01/08/18  20:21    


 


D-Dimer  > 93604 ng/mlDDU (0-234)  H  01/08/18  20:21    


 


Sodium  138 mmol/L (137-145)   01/10/18  11:00    


 


Potassium  4.4 mmol/L (3.6-5.0)   01/10/18  11:00    


 


Chloride  102.0 mmol/L ()   01/10/18  11:00    


 


Carbon Dioxide  19 mmol/L (22-30)  L  01/10/18  11:00    


 


Anion Gap  21 mmol/L  01/10/18  11:00    


 


BUN  8 mg/dL (7-17)   01/10/18  11:00    


 


Creatinine  0.8 mg/dL (0.7-1.2)   01/10/18  11:00    


 


Estimated GFR  > 60 ml/min  01/10/18  11:00    


 


BUN/Creatinine Ratio  10 %  01/10/18  11:00    


 


Glucose  193 mg/dL ()  H  01/10/18  11:00    


 


Calcium  8.7 mg/dL (8.4-10.2)   01/10/18  11:00    


 


Magnesium  2.00 mg/dL (1.7-2.3)   01/08/18  20:21    


 


Total Bilirubin  0.60 mg/dL (0.1-1.2)   01/08/18  20:21    


 


AST  30 units/L (5-40)   01/08/18  20:21    


 


ALT  28 units/L (7-56)   01/08/18  20:21    


 


Alkaline Phosphatase  89 units/L ()   01/08/18  20:21    


 


Troponin T  < 0.010 ng/mL (0.00-0.029)   01/08/18  22:55    


 


NT-Pro-B Natriuret Pep  347.7 pg/mL (0-900)   01/08/18  20:21    


 


Total Protein  7.4 g/dL (6.3-8.2)   01/08/18  20:21    


 


Albumin  3.5 g/dL (3.9-5)  L  01/08/18  20:21    


 


Albumin/Globulin Ratio  0.9 %  01/08/18  20:21    


 


TSH  3.460 mlU/mL (0.270-4.200)   01/08/18  20:21    


 


Free T4  1.52 ng/dL (0.76-1.46)  H  01/08/18  20:21    


 


Thyroxine (T4)  10.0 ug/dL (4.0-12.0)   01/10/18  11:00

## 2018-01-11 NOTE — CONSULTATION
History of Present Illness


Consult date: 01/11/18


Requesting physician: KAY CABALLERO


Consult reason: other (pulm HTN; possible LHC & RHC for eval of PAH)


History of present illness: 


The pt is a 71 YO female with a past medical history significant for rheumatoid 

arthritis, ILD, HTN, HLP, SHARMILA, pulmonary artery aneurysm, RLS. She is followed 

in our office by Dr. Lopez and is also followed by [facility]. She presents to the ER with a c/o shortness of breath and SAEZ for the 

past 2-3 months which became progressively worse over several days PTA. She 

denies any chest pain, palpitations, n/v, diaphoresis, dizziness or syncope. Pt 

called our office with these complaints and was advised to report to the ED for 

further eval/management. Following admission, pt was diagnosed with bilateral 

PE and BLE DVT. She underwent echo which showed mild LVH, EF 50-55%, impaired 

relaxation, LA mildly dilated, mild MR, mild TR, severe pulm HTN with RVSP 

72mmHg, no evidence of RV strain. Pt was seen by vascular specialists, who did 

not recommend catheter directed thrombolysis. Cardiology has been consulted for 

possible left and right heart catheterization for evaluation of PAH. 











Past History


Past Medical History: CAD, hypertension, hyperlipidemia, other (RA, fibromyalgia

, glaucoma, sleep apnea)


Past Surgical History: No surgical history


Social history: full code.  denies: smoking, alcohol abuse, prescription drug 

abuse, IV drug use


Family history: no significant family history





Medications and Allergies


 Allergies











Allergy/AdvReac Type Severity Reaction Status Date / Time


 


No Known Allergies Allergy   Verified 01/09/18 00:55











 Home Medications











 Medication  Instructions  Recorded  Confirmed  Last Taken  Type


 


Amlodipine Besylate/Benazepril 42 mg PO QAM 09/28/15 01/08/18 01/08/18 History





[amLODIPine-Benazepril 10/40 mg]     


 


AtorvaSTATin [Lipitor] 40 mg PO QDAY 09/28/15 01/08/18 01/08/18 History


 


Levothyroxine [Synthroid] 112 mcg PO QAM 09/28/15 01/08/18 01/08/18 History


 


Methotrexate Sodium/Pf 25 mg PO 1XW 09/28/15 01/08/18 01/08/18 History





[Methotrexate 50 mg/2 ml Vial]     


 


Prednisone [predniSONE] 5 mg PO QDAY 09/28/15 01/08/18 01/08/18 History


 


Benazepril HCl 40 mg PO DAILY 01/08/18 01/08/18 01/08/18 History


 


Betamethasone Dipropionate 1 applicatio TP BID 01/08/18 01/08/18 01/08/18 

History





[Betamethasone Dipropionate 0.05%     





Cream]     


 


Calcium Carbonate/Vitamin D3 1 each PO BID 01/08/18 01/08/18 01/08/18 History





[Calcium 500-Vit D3 600 Tablet]     


 


Famotidine [Pepcid] 20 mg PO DAILY 01/08/18 01/08/18 01/08/18 History


 


Folic Acid 20 mg PO QDAY 01/08/18 01/08/18 01/08/18 History


 


Gabapentin [Neurontin] 300 mg PO Q8HR 01/08/18 01/08/18 01/08/18 History


 


Mv-Mn/Folic Acid/Calcium/Vit K 1 each PO DAILY 01/08/18 01/08/18 01/08/18 

History





[Women's 50 Plus Daily Formula]     


 


Pramipexole [Mirapex] 0.125 mg PO QPM 01/08/18 01/08/18 01/08/18 History


 


Latanoprost 0.005% [Xalatan 0.005%] 1 drops HS 01/09/18 01/09/18 01/08/18 

History


 


cycloSPORINE [Restasis] 1 drop OU BID 01/09/18 01/10/18 01/09/18 15:16 History











Active Meds: 


Active Medications





Albuterol (Proventil)  2.5 mg IH Q4HRT PRN


   PRN Reason: Shortness Of Breath


   Last Admin: 01/09/18 09:57 Dose:  2.5 mg


Albuterol/Ipratropium (Duoneb *Not For Prn Use*)  1 ampul IH TIDRT [facility]


   Last Admin: 01/11/18 07:48 Dose:  1 ampul


Amlodipine Besylate (Norvasc)  10 mg PO DAILY [facility]


   Last Admin: 01/11/18 10:36 Dose:  10 mg


Atorvastatin Calcium (Lipitor)  40 mg PO QDAY [facility]


   Last Admin: 01/11/18 10:36 Dose:  40 mg


Betamethasone Dipropionate (Diprosone)  1 applic TP BID [facility]


   Last Admin: 01/10/18 10:11 Dose:  1 applic


Calcium/Vitamin D (Oysco D 500 Mg-200 Unit)  1 each PO BID [facility]


   Last Admin: 01/11/18 10:37 Dose:  1 each


Enoxaparin Sodium (Lovenox)  120 mg 1 mg/kg (120 mg) SUB-Q Q12H [facility]


   Last Admin: 01/11/18 00:45 Dose:  120 mg


Famotidine (Pepcid)  20 mg PO DAILY [facility]


   Last Admin: 01/11/18 10:36 Dose:  20 mg


Gabapentin (Neurontin)  900 mg PO Q8HR [facility]


   Last Admin: 01/11/18 10:36 Dose:  900 mg


Latanoprost (Xalatan 0.005%)  1 drops OU HS [facility]


Levothyroxine Sodium (Synthroid)  112 mcg PO QAM@0600 [facility]


   Last Admin: 01/10/18 05:20 Dose:  112 mcg


Lisinopril (Zestril)  40 mg PO QDAY [facility]


   Last Admin: 01/11/18 10:36 Dose:  40 mg


Methotrexate (Methotrexate)  25 mg IM Sa [facility]


Miscellaneous Medication (Cyclosporine [Restasis])  1 drop OU BID [facility]


   Last Admin: 01/11/18 10:38 Dose:  1 drop


Pramipexole Dihydrochloride (Mirapex)  0.125 mg PO QPM [facility]


   Last Admin: 01/10/18 17:52 Dose:  0.125 mg


Prednisone (Deltasone)  5 mg PO QDAY [facility]


   Last Admin: 01/11/18 10:36 Dose:  5 mg











Review of Systems


Constitutional: no weight loss, no weight gain, no fever, no chills, no sweats


Ears, nose, mouth and throat: no ear pain, no nose pain, no sinus pressure, no 

sinus pain


Cardiovascular: shortness of breath, dyspnea on exertion, no chest pain, no 

orthopnea, no palpitations, no rapid/irregular heart beat, no edema, no syncope

, no lightheadedness


Respiratory: shortness of breath, dyspnea on exertion, no congestion, no 

wheezing, no pain on inspiration


Gastrointestinal: no abdominal pain, no nausea, no vomiting, no diarrhea, no 

constipation, no change in bowel habits


Genitourinary Female: no pelvic pain, no flank pain, no menorrhagia, no dysuria

, no urinary frequency, no urgency


Musculoskeletal: no neck stiffness, no neck pain, no shooting arm pain, no arm 

numbness/tingling, no low back pain, no shooting leg pain, no leg numbness/

tingling, no redness of joints


Integumentary: no rash, no pruritis, no redness, no sores, no wounds


Neurological: no head injury, no transient paralysis, no paralysis, no weakness

, no parathesias, no numbness, no tingling, no seizures


Psychiatric: no anxiety


Endocrine: no cold intolerance, no heat intolerance


Hematologic/Lymphatic: no easy bruising, no easy bleeding


Allergic/Immunologic: no urticaria, no wheezing





Physical Examination


 Vital Signs











Temp Pulse Resp BP Pulse Ox


 


 97.5 F L  118 H  22   167/76   81 L


 


 01/08/18 18:33  01/08/18 18:33  01/08/18 18:33  01/08/18 18:33  01/08/18 18:33











General appearance: no acute distress


HEENT: Positive: PERRL, Normocephaly, Mucus Membranes Moist


Neck: Positive: neck supple, trachea midline


Cardiac: Positive: Reg Rate and Rhythm, S1/S2


Lungs: Positive: Rales (fibrotic)


Neuro: Positive: Grossly Intact


Abdomen: Positive: Soft.  Negative: Tender


Skin: Positive: Clear.  Negative: Rash, Wound


Musculoskeletal: No Fluid Collection, No Pain, Normal Range of Motion


Extremities: Absent: edema





Results





 01/08/18 20:21





 01/10/18 11:00


 Comprehensive Metabolic Panel











  01/10/18 Range/Units





  11:00 


 


Sodium  138  (137-145)  mmol/L


 


Potassium  4.4  (3.6-5.0)  mmol/L


 


Chloride  102.0  ()  mmol/L


 


Carbon Dioxide  19 L  (22-30)  mmol/L


 


BUN  8  (7-17)  mg/dL


 


Creatinine  0.8  (0.7-1.2)  mg/dL


 


Glucose  193 H  ()  mg/dL


 


Calcium  8.7  (8.4-10.2)  mg/dL














- Imaging and Cardiology


Echo: report reviewed


EKG: report reviewed, image reviewed





EKG interpretations





- Telemetry


EKG Rhythm: Sinus Rhythm





- EKG


Sinus rhythms and dysrhythmias: sinus tachycardia





Assessment and Plan


Assessment:


Bilateral PE


Bilateral LE DVT 


Rheumatoid arthritis


Severe pulmonary HTN


ILD


HTN


HLP


SHARMILA - compliant with CPAP


H/o pulmonary artery aneurysm


RLS





Plan:


Per pulmonary, recommend R/L cardiac catheterization at some point for further 

eval and management of PAH. 


Given presence of acute PE, do not recommend cardiac catheterization for 

further evaluation of PAH at this time. Recommend continuation of systemic 

anticoagulation for treatment of PE (agree with Vascular's recommendation of 

Eliquis) for at least 6 months time and then can consider RHC as OP. 


Currently stable cardiac status. Nothing further to add from cardiac 

perspective at this time. Will follow on as needed basis. 


Pt is to follow up with her primary pulmonary team at [place]. 


Follow up in our [place] office with Dr. Lopez on 1/17/2018 @ 10:30AM. 





The patient has been seen in conjunction with Dr. Watt who agrees with the 

assessment and plan of care.

## 2018-01-11 NOTE — PROGRESS NOTE
Assessment and Plan





Bilateral pulmonary embolism. Clinically better, hemodynamically stable.  NO 

bleeding.  Onto correlation


DVT.  On treatment


Hypoxemic respiratory failure.  Stabilized on oxygen


Interstitial lung disease.  Suggestive of rheumatoid lung in the context of 

prior RA.  Also prior history of methotrexate treatment.


Pulmonary hypertension.  Possibly chronic, it is group 1 PAH, chronic 

thromboembolic pulmonary hypertension or group III/chronic hypoxemia/ILD 

related .  








Recommendations








Continue current oxygen support


Continue anticoagulation for pulmonary embolism. Likely lifelong TX


CPAP with oxygen nighttime


Regarding her ILD, the patient will need further reevaluation.  


She plans to follow-up with her pulmonologist in the morning, for her ILD 

management


Come us for cardiology in order, plan is to do OPD R/L cardiac catheterization.

  We will recommend this to be done on the next 6 weeks following the acute 

phase of the pulmonary embolism episode


Check pending labs








Findings were discussed with the patient and her  in detail.  I will 

happy to follow this patient, butshe plans to go to [place] for pulmonary follow-

up.  Therefore, I will sign off at this point.  Please feel free to reconsult 

if necessary





Thank you











 





Subjective


Date of service: 01/11/18


Interval history: 





Complaints of some soft stools, diarrhea this morning.  Breathing feels better, 

currently on oxygen.  No bleeding reported no hemoptysis





Objective


 Vital Signs - 12hr











  01/11/18 01/11/18 01/11/18





  04:00 04:27 07:40


 


Temperature  98.6 F 


 


Pulse Rate 89  


 


Pulse Rate [   102 H





Bilateral]   


 


Respiratory  18 





Rate   


 


Respiratory   30 H





Rate [Bilateral   





]   


 


Blood Pressure  114/57 


 


O2 Sat by Pulse 91  





Oximetry   














  01/11/18 01/11/18 01/11/18





  07:52 07:53 07:58


 


Temperature   98.2 F


 


Pulse Rate   106 H


 


Pulse Rate [ 101 H  





Bilateral]   


 


Respiratory   20





Rate   


 


Respiratory 24  





Rate [Bilateral   





]   


 


Blood Pressure   130/82


 


O2 Sat by Pulse  92 91





Oximetry   














  01/11/18





  10:36


 


Temperature 


 


Pulse Rate 106 H


 


Pulse Rate [ 





Bilateral] 


 


Respiratory 





Rate 


 


Respiratory 





Rate [Bilateral 





] 


 


Blood Pressure 130/82


 


O2 Sat by Pulse 





Oximetry 











Constitutional: no acute distress, alert, other (morbidly obese)


Eyes: non-icteric


ENT: oropharynx moist


Neck: no JVD


Effort: normal


Ascultation: Bilateral: rales (bilateral inspiratory crackles lower half of 

both pulmonary fields.  )


Percussion: Bilateral: not dull


Cardiovascular: regular rate and rhythm, other (no murmur)


Gastrointestinal: normoactive bowel sounds, non-tender, non-distended


Integumentary: normal


Extremities: cyanosis, other (Clubbing present)


Neurologic: normal mental status, non-focal exam, CN II-XII normal


Psychiatric: mood appropriate, affect normal


CBC and BMP: 


 01/08/18 20:21





 01/10/18 11:00


ABG, PT/INR, D-dimer: 


PT/INR, D-dimer











PT  13.8 Sec. (12.2-14.9)   01/08/18  20:21    


 


INR  1.01  (0.87-1.13)   01/08/18  20:21    


 


D-Dimer  > 56889 ng/mlDDU (0-234)  H  01/08/18  20:21    








Abnormal lab findings: 


 Abnormal Labs











  01/08/18 01/08/18 01/08/18





  20:21 20:21 20:21


 


WBC  13.5 H  


 


RBC  5.22 H  


 


Hgb  16.1 H  


 


Hct  50.5 H  


 


RDW  16.9 H  


 


Lymph % (Auto)  9.0 L  


 


Mono #  1.0 H  


 


Seg Neutrophils %  80.3 H  


 


Seg Neutrophils #  10.8 H  


 


D-Dimer   > 93691 H 


 


Carbon Dioxide   


 


Glucose    103 H


 


Albumin    3.5 L


 


Free T4   














  01/08/18 01/10/18





  20:21 11:00


 


WBC  


 


RBC  


 


Hgb  


 


Hct  


 


RDW  


 


Lymph % (Auto)  


 


Mono #  


 


Seg Neutrophils %  


 


Seg Neutrophils #  


 


D-Dimer  


 


Carbon Dioxide   19 L


 


Glucose   193 H


 


Albumin  


 


Free T4  1.52 H

## 2018-01-11 NOTE — DISCHARGE SUMMARY
Providers





- Providers


Date of Admission: 


01/09/18 00:54





Attending physician: 


KAY CABALLERO MD





 





01/09/18 04:37


Consult to Physician [CONS] Routine 


   Consulting Provider: MATTHEW BARGER


   Reason For Exam: large PE


   Place consult to:: Vascular surgery


   Notified:: yes


   Phone number called:: [phone]


   If yes, spoke with:: radha Wiggins called:: 08:06


   Comment:: jarad





01/09/18 04:38


Consult to Physician [CONS] Routine 


   Consulting Provider: JEREMIAS LUI


   Reason For Exam: ILD


   Place consult to:: answering  service


   Notified:: yes


   Phone number called:: [phone]


   If yes, spoke with:: cecelia Wiggins called:: 08:09


   Comment:: jarad





01/09/18 11:47


Physical Therapy Evaluation and Treat [CONS] Routine 


   Comment: 


   Reason For Exam: debility


Speech Therapy Evaluation and Treat [CONS] Routine 


   Reason For Exam: dysphagia





01/10/18 17:24


Consult to Physician [CONS] Routine 


   Consulting Provider: DINH JIMENEZ


   Reason For Exam: for left and r heart cath for eval PAH


   Place consult to:: 


   Notified:: 


   Phone number called:: [phone]


   Was contact made?: Yes


   If yes, spoke with:: ADRIENNE Wiggins called:: 18:11


   Comment:: ARIS











Primary care physician: 


WHITLEY HERRERA








Hospitalization


Condition: Good


Hospital course: 





72-year-old female with medical history significant for interstitial lung 

disease, rheumatoid arthritis, fibromyalgia, glaucoma, sleep apnea presented to 

the ED complaining of shortness of breath, she was found to have large 

bilateral PE and bilateral DVT.  She was started on subcutaneous 

anticoagulation.  She received pulmonology consult and vascular consults who 

both recommended continued anticoagulation.  She was seen by cardiology who 

recommended that she follow up with her outpatient cardiologist for possible 

right and left heart cath, if they have not been done before.  She was 

therefore discharged on oral anticoagulation





Discharge diagnoses


Large bilateral PE


Bilateral DVT


Interstitial lung disease


Rheumatoid arthritis


Obstructive sleep apnea


Acute on chronic hypoxic respiratory failure


Coronary artery disease











Disposition: DC-01 TO HOME OR SELFCARE


Time spent for discharge: 33 minutes





Core Measure Documentation





- Palliative Care


Palliative Care/ Comfort Measures: Not Applicable





- Core Measures


Any of the following diagnoses?: none





Exam





- Constitutional


Vitals: 


 











Temp Pulse Resp BP Pulse Ox


 


 98.2 F   106 H  20   130/82   91 


 


 01/11/18 07:58  01/11/18 10:36  01/11/18 07:58  01/11/18 10:36  01/11/18 07:58











General appearance: Present: no acute distress, well-nourished, other (obese)





- EENT


Eyes: Present: PERRL


ENT: hearing intact, clear oral mucosa





- Neck


Neck: Present: supple, normal ROM





- Respiratory


Respiratory effort: normal


Respiratory: bilateral: other (velcro like sounds in both bases)





- Cardiovascular


Heart Sounds: Present: S1 & S2.  Absent: rub, click





- Extremities


Extremities: pulses symmetrical, No edema


Peripheral Pulses: within normal limits





- Abdominal


General gastrointestinal: Present: soft, non-tender, non-distended, normal 

bowel sounds


Female genitourinary: Present: normal





- Integumentary


Integumentary: Present: clear, warm, dry





- Musculoskeletal


Musculoskeletal: gait normal, strength equal bilaterally





- Psychiatric


Psychiatric: appropriate mood/affect, intact judgment & insight





- Neurologic


Neurologic: CNII-XII intact, moves all extremities





Plan


Follow up with: 


WHITLEY HERRERA MD [Primary Care Provider] - 3-5 Days


Prescriptions: 


Apixaban [Eliquis] 5 mg PO BID #70 tablet

## 2018-01-11 NOTE — VASCULAR LAB REPORT
LOWER EXTREMITY VENOUS DUPLEX:



REASON FOR EXAM: Swelling of the lower extremities. Pulmonary embolism 

shortness of breath.



COMMENTS ON THE RIGHT:

Acute deep venous thrombus noted in the right posterior tibial vein. 

The remaining veins visualized are freely compressible without evidence 

of internal echogenicity.  Spontaneous and phasic flow is present 

proximally.



COMMENTS ON THE LEFT:

Acute deep venous thrombus the left peroneal vein which extends into 

the left soleal muscle veins.  The remaining veins visualized are 

freely compressible without evidence of internal echogenicity.  

Spontaneous and phasic flow is present proximally.



IMPRESSION:

Bilateral lower extremity distal acute deep venous thrombus.

## 2021-10-26 NOTE — XRAY REPORT
FINAL REPORT



EXAM:  XR CHEST ROUTINE 2V



HISTORY:  Dyspnea 



TECHNIQUE:  2 views of the chest.



PRIORS:  None.



FINDINGS:  

The cardiomediastinal silhouette appears normal. The bilateral

interstitial markings are diffusely prominent especially

peripherally. There is no evidence of pleural effusion. The bones

are diffusely demineralized. 



IMPRESSION:  

Findings are most consistent with chronic interstitial lung

disease
ambulatory